# Patient Record
Sex: MALE | Race: BLACK OR AFRICAN AMERICAN | NOT HISPANIC OR LATINO | URBAN - METROPOLITAN AREA
[De-identification: names, ages, dates, MRNs, and addresses within clinical notes are randomized per-mention and may not be internally consistent; named-entity substitution may affect disease eponyms.]

---

## 2018-01-21 ENCOUNTER — INPATIENT (INPATIENT)
Facility: HOSPITAL | Age: 83
LOS: 1 days | Discharge: HOME | End: 2018-01-23
Attending: INTERNAL MEDICINE | Admitting: INTERNAL MEDICINE
Payer: MEDICARE

## 2018-01-21 VITALS
SYSTOLIC BLOOD PRESSURE: 181 MMHG | DIASTOLIC BLOOD PRESSURE: 111 MMHG | OXYGEN SATURATION: 100 % | HEART RATE: 42 BPM | TEMPERATURE: 98 F | RESPIRATION RATE: 16 BRPM

## 2018-01-21 DIAGNOSIS — R06.09 OTHER FORMS OF DYSPNEA: ICD-10-CM

## 2018-01-21 LAB
ALBUMIN SERPL ELPH-MCNC: 4 G/DL — SIGNIFICANT CHANGE UP (ref 3.3–5)
ALP SERPL-CCNC: 61 U/L — SIGNIFICANT CHANGE UP (ref 40–120)
ALT FLD-CCNC: 72 U/L — HIGH (ref 4–41)
APPEARANCE UR: CLEAR — SIGNIFICANT CHANGE UP
APTT BLD: 30.4 SEC — SIGNIFICANT CHANGE UP (ref 27.5–37.4)
AST SERPL-CCNC: 143 U/L — HIGH (ref 4–40)
B PERT DNA SPEC QL NAA+PROBE: SIGNIFICANT CHANGE UP
BASE EXCESS BLDV CALC-SCNC: 4.1 MMOL/L — SIGNIFICANT CHANGE UP
BASOPHILS # BLD AUTO: 0.01 K/UL — SIGNIFICANT CHANGE UP (ref 0–0.2)
BASOPHILS NFR BLD AUTO: 0.2 % — SIGNIFICANT CHANGE UP (ref 0–2)
BILIRUB SERPL-MCNC: 0.5 MG/DL — SIGNIFICANT CHANGE UP (ref 0.2–1.2)
BILIRUB UR-MCNC: NEGATIVE — SIGNIFICANT CHANGE UP
BLOOD GAS VENOUS - CREATININE: 1.7 MG/DL — HIGH (ref 0.5–1.3)
BLOOD UR QL VISUAL: NEGATIVE — SIGNIFICANT CHANGE UP
BUN SERPL-MCNC: 27 MG/DL — HIGH (ref 7–23)
C PNEUM DNA SPEC QL NAA+PROBE: NOT DETECTED — SIGNIFICANT CHANGE UP
CALCIUM SERPL-MCNC: 9 MG/DL — SIGNIFICANT CHANGE UP (ref 8.4–10.5)
CHLORIDE BLDV-SCNC: 100 MMOL/L — SIGNIFICANT CHANGE UP (ref 96–108)
CHLORIDE SERPL-SCNC: 98 MMOL/L — SIGNIFICANT CHANGE UP (ref 98–107)
CK MB BLD-MCNC: 0.3 — SIGNIFICANT CHANGE UP (ref 0–2.5)
CK MB BLD-MCNC: 3.58 NG/ML — SIGNIFICANT CHANGE UP (ref 1–6.6)
CK MB BLD-MCNC: 3.61 NG/ML — SIGNIFICANT CHANGE UP (ref 1–6.6)
CK SERPL-CCNC: 1059 U/L — HIGH (ref 30–200)
CK SERPL-CCNC: 1378 U/L — HIGH (ref 30–200)
CO2 SERPL-SCNC: 27 MMOL/L — SIGNIFICANT CHANGE UP (ref 22–31)
COLOR SPEC: SIGNIFICANT CHANGE UP
CREAT SERPL-MCNC: 1.68 MG/DL — HIGH (ref 0.5–1.3)
D DIMER BLD IA.RAPID-MCNC: < 150 NG/ML — SIGNIFICANT CHANGE UP
EOSINOPHIL # BLD AUTO: 0.05 K/UL — SIGNIFICANT CHANGE UP (ref 0–0.5)
EOSINOPHIL NFR BLD AUTO: 0.9 % — SIGNIFICANT CHANGE UP (ref 0–6)
FLUAV H1 2009 PAND RNA SPEC QL NAA+PROBE: NOT DETECTED — SIGNIFICANT CHANGE UP
FLUAV H1 RNA SPEC QL NAA+PROBE: NOT DETECTED — SIGNIFICANT CHANGE UP
FLUAV H3 RNA SPEC QL NAA+PROBE: NOT DETECTED — SIGNIFICANT CHANGE UP
FLUAV SUBTYP SPEC NAA+PROBE: SIGNIFICANT CHANGE UP
FLUBV RNA SPEC QL NAA+PROBE: NOT DETECTED — SIGNIFICANT CHANGE UP
GAS PNL BLDV: 138 MMOL/L — SIGNIFICANT CHANGE UP (ref 136–146)
GLUCOSE BLDV-MCNC: 105 — HIGH (ref 70–99)
GLUCOSE SERPL-MCNC: 96 MG/DL — SIGNIFICANT CHANGE UP (ref 70–99)
GLUCOSE UR-MCNC: NEGATIVE — SIGNIFICANT CHANGE UP
HADV DNA SPEC QL NAA+PROBE: NOT DETECTED — SIGNIFICANT CHANGE UP
HCO3 BLDV-SCNC: 27 MMOL/L — SIGNIFICANT CHANGE UP (ref 20–27)
HCOV 229E RNA SPEC QL NAA+PROBE: NOT DETECTED — SIGNIFICANT CHANGE UP
HCOV HKU1 RNA SPEC QL NAA+PROBE: NOT DETECTED — SIGNIFICANT CHANGE UP
HCOV NL63 RNA SPEC QL NAA+PROBE: NOT DETECTED — SIGNIFICANT CHANGE UP
HCOV OC43 RNA SPEC QL NAA+PROBE: NOT DETECTED — SIGNIFICANT CHANGE UP
HCT VFR BLD CALC: 38.7 % — LOW (ref 39–50)
HCT VFR BLDV CALC: 41.9 % — SIGNIFICANT CHANGE UP (ref 39–51)
HGB BLD-MCNC: 13.4 G/DL — SIGNIFICANT CHANGE UP (ref 13–17)
HGB BLDV-MCNC: 13.6 G/DL — SIGNIFICANT CHANGE UP (ref 13–17)
HMPV RNA SPEC QL NAA+PROBE: NOT DETECTED — SIGNIFICANT CHANGE UP
HPIV1 RNA SPEC QL NAA+PROBE: NOT DETECTED — SIGNIFICANT CHANGE UP
HPIV2 RNA SPEC QL NAA+PROBE: NOT DETECTED — SIGNIFICANT CHANGE UP
HPIV3 RNA SPEC QL NAA+PROBE: NOT DETECTED — SIGNIFICANT CHANGE UP
HPIV4 RNA SPEC QL NAA+PROBE: NOT DETECTED — SIGNIFICANT CHANGE UP
IMM GRANULOCYTES # BLD AUTO: 0.04 # — SIGNIFICANT CHANGE UP
IMM GRANULOCYTES NFR BLD AUTO: 0.7 % — SIGNIFICANT CHANGE UP (ref 0–1.5)
INR BLD: 1.26 — HIGH (ref 0.88–1.17)
KETONES UR-MCNC: NEGATIVE — SIGNIFICANT CHANGE UP
LACTATE BLDV-MCNC: 1.7 MMOL/L — SIGNIFICANT CHANGE UP (ref 0.5–2)
LEUKOCYTE ESTERASE UR-ACNC: NEGATIVE — SIGNIFICANT CHANGE UP
LYMPHOCYTES # BLD AUTO: 1.74 K/UL — SIGNIFICANT CHANGE UP (ref 1–3.3)
LYMPHOCYTES # BLD AUTO: 31.2 % — SIGNIFICANT CHANGE UP (ref 13–44)
M PNEUMO DNA SPEC QL NAA+PROBE: NOT DETECTED — SIGNIFICANT CHANGE UP
MCHC RBC-ENTMCNC: 29.5 PG — SIGNIFICANT CHANGE UP (ref 27–34)
MCHC RBC-ENTMCNC: 34.6 % — SIGNIFICANT CHANGE UP (ref 32–36)
MCV RBC AUTO: 85.1 FL — SIGNIFICANT CHANGE UP (ref 80–100)
MONOCYTES # BLD AUTO: 0.8 K/UL — SIGNIFICANT CHANGE UP (ref 0–0.9)
MONOCYTES NFR BLD AUTO: 14.3 % — HIGH (ref 2–14)
MUCOUS THREADS # UR AUTO: SIGNIFICANT CHANGE UP
NEUTROPHILS # BLD AUTO: 2.94 K/UL — SIGNIFICANT CHANGE UP (ref 1.8–7.4)
NEUTROPHILS NFR BLD AUTO: 52.7 % — SIGNIFICANT CHANGE UP (ref 43–77)
NITRITE UR-MCNC: NEGATIVE — SIGNIFICANT CHANGE UP
NRBC # FLD: 0 — SIGNIFICANT CHANGE UP
NT-PROBNP SERPL-SCNC: 203.8 PG/ML — SIGNIFICANT CHANGE UP
PCO2 BLDV: 39 MMHG — LOW (ref 41–51)
PH BLDV: 7.46 PH — HIGH (ref 7.32–7.43)
PH UR: 6 — SIGNIFICANT CHANGE UP (ref 4.6–8)
PLATELET # BLD AUTO: 254 K/UL — SIGNIFICANT CHANGE UP (ref 150–400)
PMV BLD: 10 FL — SIGNIFICANT CHANGE UP (ref 7–13)
PO2 BLDV: 32 MMHG — LOW (ref 35–40)
POTASSIUM BLDV-SCNC: 3.3 MMOL/L — LOW (ref 3.4–4.5)
POTASSIUM SERPL-MCNC: 3.4 MMOL/L — LOW (ref 3.5–5.3)
POTASSIUM SERPL-SCNC: 3.4 MMOL/L — LOW (ref 3.5–5.3)
PROT SERPL-MCNC: 7.9 G/DL — SIGNIFICANT CHANGE UP (ref 6–8.3)
PROT UR-MCNC: NEGATIVE MG/DL — SIGNIFICANT CHANGE UP
PROTHROM AB SERPL-ACNC: 14.6 SEC — HIGH (ref 9.8–13.1)
RBC # BLD: 4.55 M/UL — SIGNIFICANT CHANGE UP (ref 4.2–5.8)
RBC # FLD: 12.7 % — SIGNIFICANT CHANGE UP (ref 10.3–14.5)
RBC CASTS # UR COMP ASSIST: SIGNIFICANT CHANGE UP (ref 0–?)
RSV RNA SPEC QL NAA+PROBE: NOT DETECTED — SIGNIFICANT CHANGE UP
RV+EV RNA SPEC QL NAA+PROBE: NOT DETECTED — SIGNIFICANT CHANGE UP
SAO2 % BLDV: 59.6 % — LOW (ref 60–85)
SODIUM SERPL-SCNC: 139 MMOL/L — SIGNIFICANT CHANGE UP (ref 135–145)
SP GR SPEC: 1.01 — SIGNIFICANT CHANGE UP (ref 1–1.04)
TROPONIN T SERPL-MCNC: < 0.06 NG/ML — SIGNIFICANT CHANGE UP (ref 0–0.06)
TROPONIN T SERPL-MCNC: < 0.06 NG/ML — SIGNIFICANT CHANGE UP (ref 0–0.06)
UROBILINOGEN FLD QL: NORMAL MG/DL — SIGNIFICANT CHANGE UP
WBC # BLD: 5.58 K/UL — SIGNIFICANT CHANGE UP (ref 3.8–10.5)
WBC # FLD AUTO: 5.58 K/UL — SIGNIFICANT CHANGE UP (ref 3.8–10.5)
WBC UR QL: SIGNIFICANT CHANGE UP (ref 0–?)

## 2018-01-21 PROCEDURE — 71250 CT THORAX DX C-: CPT | Mod: 26

## 2018-01-21 PROCEDURE — 71045 X-RAY EXAM CHEST 1 VIEW: CPT | Mod: 26

## 2018-01-21 RX ORDER — ALBUTEROL 90 UG/1
2.5 AEROSOL, METERED ORAL
Qty: 0 | Refills: 0 | Status: COMPLETED | OUTPATIENT
Start: 2018-01-21 | End: 2018-01-21

## 2018-01-21 RX ORDER — POTASSIUM CHLORIDE 20 MEQ
20 PACKET (EA) ORAL ONCE
Qty: 0 | Refills: 0 | Status: COMPLETED | OUTPATIENT
Start: 2018-01-21 | End: 2018-01-22

## 2018-01-21 RX ORDER — ASPIRIN/CALCIUM CARB/MAGNESIUM 324 MG
324 TABLET ORAL ONCE
Qty: 0 | Refills: 0 | Status: COMPLETED | OUTPATIENT
Start: 2018-01-21 | End: 2018-01-21

## 2018-01-21 RX ADMIN — ALBUTEROL 2.5 MILLIGRAM(S): 90 AEROSOL, METERED ORAL at 17:02

## 2018-01-21 RX ADMIN — ALBUTEROL 2.5 MILLIGRAM(S): 90 AEROSOL, METERED ORAL at 17:27

## 2018-01-21 RX ADMIN — Medication 324 MILLIGRAM(S): at 18:07

## 2018-01-21 RX ADMIN — ALBUTEROL 2.5 MILLIGRAM(S): 90 AEROSOL, METERED ORAL at 17:14

## 2018-01-21 NOTE — ED ADULT NURSE NOTE - OBJECTIVE STATEMENT
Richard RN- Pt received to spot #5. AAOx4, c/o SOB and dizziness. Pt c/o productive cough. States he is currently taking azithromycin antibiotics prescribed by his MD. Denies fever/chills. Denies chest pain. Respiratory even and unlabored. MD isbell being performed at bedside. #18g IVSL placed to right ac, labs drawn and sent. Family member at bedside. Repot given off to primary RN in area. no acute distress. Awaiting further plan of care.

## 2018-01-21 NOTE — ED ADULT TRIAGE NOTE - CHIEF COMPLAINT QUOTE
Pt c/o SOB, saw MD and was given antibiotics. Condition worsening. Pt c/o SOB and dizziness. hx cardiac stents. Pt HR fluctuating between 40-59.

## 2018-01-21 NOTE — ED PROVIDER NOTE - ATTENDING CONTRIBUTION TO CARE
Attending Note (Esther): patient with sob, exertional dyspnea and dyspnea at rest. recent outpatient tx with z-pack.  lungs decreased bs at bases.  tachypnea.  concern for acs vs chf vs pna.

## 2018-01-21 NOTE — ED ADULT NURSE REASSESSMENT NOTE - NS ED NURSE REASSESS COMMENT FT1
pt on bed aox3 denies SOB, after nebulization,  dizziness CP HA palpitation on cm sinus arrhythmia with PVC will monitor

## 2018-01-21 NOTE — ED PROVIDER NOTE - OBJECTIVE STATEMENT
81 y/o M with h/o CAD s/p 2 stents last a decade ago, HTN, HLD, on eliquis (for unknown reason) presents with complaint of shortness of breath worsening x one week. Patient has also had predominantly nonproductive cough for same amount of time. Gets SOB with minimal exertion whereas previously he was able to perform tasks without difficulty. Was evaluated by PMD at [facility] and started on azithromycin, finished 4/5 days of treatment. +Subjective fever and generalized weakness. Had near syncopal episode one week ago. States that he gets intermittent chest heaviness.

## 2018-01-21 NOTE — ED PROVIDER NOTE - MEDICAL DECISION MAKING DETAILS
81 y/o M with CAD s/p stents, HTN, HLD presents with complaint of cough and SOB x one week which has worsened despite treatment with azithromycin. Bibasilar crackles and wheezing. CHF vs PNA vs Viral. Breathing tx, labs, CXR, reassess. Will likely need admission 81 y/o M with CAD s/p stents, HTN, HLD presents with complaint of cough and SOB x one week which has worsened despite treatment with azithromycin. Bibasilar crackles and wheezing. CHF vs ACS vs PNA vs Viral. Breathing tx, labs, CXR, reassess. Will likely need admission

## 2018-01-22 DIAGNOSIS — I10 ESSENTIAL (PRIMARY) HYPERTENSION: ICD-10-CM

## 2018-01-22 DIAGNOSIS — R06.09 OTHER FORMS OF DYSPNEA: ICD-10-CM

## 2018-01-22 DIAGNOSIS — Z29.9 ENCOUNTER FOR PROPHYLACTIC MEASURES, UNSPECIFIED: ICD-10-CM

## 2018-01-22 DIAGNOSIS — E78.5 HYPERLIPIDEMIA, UNSPECIFIED: ICD-10-CM

## 2018-01-22 DIAGNOSIS — R79.89 OTHER SPECIFIED ABNORMAL FINDINGS OF BLOOD CHEMISTRY: ICD-10-CM

## 2018-01-22 DIAGNOSIS — M62.82 RHABDOMYOLYSIS: ICD-10-CM

## 2018-01-22 LAB
BUN SERPL-MCNC: 25 MG/DL — HIGH (ref 7–23)
CALCIUM SERPL-MCNC: 8.6 MG/DL — SIGNIFICANT CHANGE UP (ref 8.4–10.5)
CHLORIDE SERPL-SCNC: 107 MMOL/L — SIGNIFICANT CHANGE UP (ref 98–107)
CHOLEST SERPL-MCNC: 132 MG/DL — SIGNIFICANT CHANGE UP (ref 120–199)
CK MB BLD-MCNC: 4.51 NG/ML — SIGNIFICANT CHANGE UP (ref 1–6.6)
CK SERPL-CCNC: 870 U/L — HIGH (ref 30–200)
CO2 SERPL-SCNC: 25 MMOL/L — SIGNIFICANT CHANGE UP (ref 22–31)
CREAT SERPL-MCNC: 1.71 MG/DL — HIGH (ref 0.5–1.3)
GLUCOSE SERPL-MCNC: 107 MG/DL — HIGH (ref 70–99)
HAV IGM SER-ACNC: NONREACTIVE — SIGNIFICANT CHANGE UP
HBA1C BLD-MCNC: 6.3 % — HIGH (ref 4–5.6)
HBV CORE IGM SER-ACNC: NONREACTIVE — SIGNIFICANT CHANGE UP
HBV SURFACE AG SER-ACNC: NONREACTIVE — SIGNIFICANT CHANGE UP
HCT VFR BLD CALC: 36.6 % — LOW (ref 39–50)
HCV AB S/CO SERPL IA: 0.12 S/CO — SIGNIFICANT CHANGE UP
HCV AB SERPL-IMP: SIGNIFICANT CHANGE UP
HDLC SERPL-MCNC: 30 MG/DL — LOW (ref 35–55)
HGB BLD-MCNC: 12 G/DL — LOW (ref 13–17)
LIPID PNL WITH DIRECT LDL SERPL: 91 MG/DL — SIGNIFICANT CHANGE UP
MAGNESIUM SERPL-MCNC: 2 MG/DL — SIGNIFICANT CHANGE UP (ref 1.6–2.6)
MCHC RBC-ENTMCNC: 28.4 PG — SIGNIFICANT CHANGE UP (ref 27–34)
MCHC RBC-ENTMCNC: 32.8 % — SIGNIFICANT CHANGE UP (ref 32–36)
MCV RBC AUTO: 86.7 FL — SIGNIFICANT CHANGE UP (ref 80–100)
NRBC # FLD: 0 — SIGNIFICANT CHANGE UP
PLATELET # BLD AUTO: 244 K/UL — SIGNIFICANT CHANGE UP (ref 150–400)
PMV BLD: 10 FL — SIGNIFICANT CHANGE UP (ref 7–13)
POTASSIUM SERPL-MCNC: 3.8 MMOL/L — SIGNIFICANT CHANGE UP (ref 3.5–5.3)
POTASSIUM SERPL-SCNC: 3.8 MMOL/L — SIGNIFICANT CHANGE UP (ref 3.5–5.3)
RBC # BLD: 4.22 M/UL — SIGNIFICANT CHANGE UP (ref 4.2–5.8)
RBC # FLD: 12.9 % — SIGNIFICANT CHANGE UP (ref 10.3–14.5)
SODIUM SERPL-SCNC: 148 MMOL/L — HIGH (ref 135–145)
TRIGL SERPL-MCNC: 109 MG/DL — SIGNIFICANT CHANGE UP (ref 10–149)
TROPONIN T SERPL-MCNC: < 0.06 NG/ML — SIGNIFICANT CHANGE UP (ref 0–0.06)
TSH SERPL-MCNC: 1.62 UIU/ML — SIGNIFICANT CHANGE UP (ref 0.27–4.2)
WBC # BLD: 4.7 K/UL — SIGNIFICANT CHANGE UP (ref 3.8–10.5)
WBC # FLD AUTO: 4.7 K/UL — SIGNIFICANT CHANGE UP (ref 3.8–10.5)

## 2018-01-22 PROCEDURE — 99223 1ST HOSP IP/OBS HIGH 75: CPT

## 2018-01-22 PROCEDURE — 93306 TTE W/DOPPLER COMPLETE: CPT | Mod: 26

## 2018-01-22 PROCEDURE — 76705 ECHO EXAM OF ABDOMEN: CPT | Mod: 26

## 2018-01-22 RX ORDER — CHOLECALCIFEROL (VITAMIN D3) 125 MCG
1000 CAPSULE ORAL DAILY
Qty: 0 | Refills: 0 | Status: DISCONTINUED | OUTPATIENT
Start: 2018-01-22 | End: 2018-01-23

## 2018-01-22 RX ORDER — TIMOLOL 0.5 %
1 DROPS OPHTHALMIC (EYE) DAILY
Qty: 0 | Refills: 0 | Status: DISCONTINUED | OUTPATIENT
Start: 2018-01-22 | End: 2018-01-23

## 2018-01-22 RX ORDER — NIFEDIPINE 30 MG
30 TABLET, EXTENDED RELEASE 24 HR ORAL DAILY
Qty: 0 | Refills: 0 | Status: DISCONTINUED | OUTPATIENT
Start: 2018-01-22 | End: 2018-01-23

## 2018-01-22 RX ORDER — METOPROLOL TARTRATE 50 MG
50 TABLET ORAL
Qty: 0 | Refills: 0 | Status: DISCONTINUED | OUTPATIENT
Start: 2018-01-22 | End: 2018-01-23

## 2018-01-22 RX ORDER — METOPROLOL TARTRATE 50 MG
5 TABLET ORAL ONCE
Qty: 0 | Refills: 0 | Status: COMPLETED | OUTPATIENT
Start: 2018-01-22 | End: 2018-01-22

## 2018-01-22 RX ORDER — POTASSIUM CHLORIDE 20 MEQ
10 PACKET (EA) ORAL ONCE
Qty: 0 | Refills: 0 | Status: COMPLETED | OUTPATIENT
Start: 2018-01-22 | End: 2018-01-22

## 2018-01-22 RX ORDER — SODIUM CHLORIDE 9 MG/ML
1000 INJECTION INTRAMUSCULAR; INTRAVENOUS; SUBCUTANEOUS
Qty: 0 | Refills: 0 | Status: DISCONTINUED | OUTPATIENT
Start: 2018-01-22 | End: 2018-01-23

## 2018-01-22 RX ORDER — APIXABAN 2.5 MG/1
2.5 TABLET, FILM COATED ORAL EVERY 12 HOURS
Qty: 0 | Refills: 0 | Status: DISCONTINUED | OUTPATIENT
Start: 2018-01-22 | End: 2018-01-23

## 2018-01-22 RX ORDER — SODIUM CHLORIDE 9 MG/ML
500 INJECTION INTRAMUSCULAR; INTRAVENOUS; SUBCUTANEOUS ONCE
Qty: 0 | Refills: 0 | Status: COMPLETED | OUTPATIENT
Start: 2018-01-22 | End: 2018-01-22

## 2018-01-22 RX ORDER — CHOLECALCIFEROL (VITAMIN D3) 125 MCG
1 CAPSULE ORAL
Qty: 0 | Refills: 0 | COMMUNITY

## 2018-01-22 RX ADMIN — Medication 20 MILLIEQUIVALENT(S): at 00:03

## 2018-01-22 RX ADMIN — APIXABAN 2.5 MILLIGRAM(S): 2.5 TABLET, FILM COATED ORAL at 05:52

## 2018-01-22 RX ADMIN — Medication 5 MILLIGRAM(S): at 02:16

## 2018-01-22 RX ADMIN — Medication 1000 UNIT(S): at 11:28

## 2018-01-22 RX ADMIN — SODIUM CHLORIDE 1000 MILLILITER(S): 9 INJECTION INTRAMUSCULAR; INTRAVENOUS; SUBCUTANEOUS at 00:45

## 2018-01-22 RX ADMIN — Medication 1 DROP(S): at 18:46

## 2018-01-22 RX ADMIN — Medication 50 MILLIGRAM(S): at 05:52

## 2018-01-22 RX ADMIN — APIXABAN 2.5 MILLIGRAM(S): 2.5 TABLET, FILM COATED ORAL at 18:47

## 2018-01-22 RX ADMIN — Medication 30 MILLIGRAM(S): at 05:52

## 2018-01-22 RX ADMIN — Medication 100 MILLIEQUIVALENT(S): at 08:51

## 2018-01-22 RX ADMIN — SODIUM CHLORIDE 50 MILLILITER(S): 9 INJECTION INTRAMUSCULAR; INTRAVENOUS; SUBCUTANEOUS at 01:20

## 2018-01-22 RX ADMIN — Medication 50 MILLIGRAM(S): at 18:47

## 2018-01-22 NOTE — H&P ADULT - RS GEN PE MLT RESP DETAILS PC
no rales/no wheezes/clear to auscultation bilaterally/good air movement/normal/airway patent/no chest wall tenderness/breath sounds equal/no rhonchi/respirations non-labored/no intercostal retractions

## 2018-01-22 NOTE — H&P ADULT - PROBLEM SELECTOR PLAN 5
Will hold home Pravastatin for now as patient with elevated CK level   Lipid profile in am, low cholesterol diet recommended

## 2018-01-22 NOTE — CONSULT NOTE ADULT - SUBJECTIVE AND OBJECTIVE BOX
cc syncope and SOB    I[place] 81 y/o M with PMH of CAD(s/p 2 stents about 10 years ago), HTN, HLD, On Eliquis for unknown reason as per patient presented with the complaint of SOB x 1 week and generalized weakness. As per the patient he developed gradual shortness of breath last Saturday. Patient stated that Friday night he went to bed fine without any complaints, and woke up the next day with acute SOB. Patient stated that the SOB is worse with exertion with mild relief when he would rest. Patient also endorsed generalized weakness which caused him to fall in the kitchen last Saturday. Patient stated that he was unable to get up and called his neighbor who helped him up. Patient denied any head trauma, LOC, seizure like activity, tongue laceration or any bowel/bladder incontinence. Patient also endorsed dry cough for the past week. Patient had seen his PMD at the [facility] and was started on Azithromycin which he finished 4/5 days. Patient also endorsed subjective fevers and chills. Patient denied any orthopnea or any PND. Patient denied any CP, N/V/D/C, abdominal pain, melena, hematochezia, recent travel, sick contact, pleuritic or positional chest pain.     Allergies NKDA    Home Medications:   * Patient Currently Takes Medications as of 2018 00:36 documented in Structured Notes  · 	NIFEdipine 30 mg oral tablet, extended release: 1 tab(s) orally once a day, Last Dose Taken:    · 	metoprolol tartrate 50 mg oral tablet: 1 tab(s) orally 2 times a day, Last Dose Taken:    · 	valsartan 320 mg oral tablet: 1 tab(s) orally once a day, Last Dose Taken:    · 	pravastatin 40 mg oral tablet: 1 tab(s) orally once a day, Last Dose Taken:    · 	timolol hemihydrate 0.5% ophthalmic solution: 1 drop(s) to each affected eye 2 times a day, Last Dose Taken:    · 	Eliquis 5 mg oral tablet: 1 tab(s) orally 2 times a day, Last Dose Taken:    · 	Vitamin D3 1000 intl units oral tablet: 1 tab(s) orally once a day  · 	famotidine 40 mg oral tablet: 1 tab(s) orally once a day, As Needed - for heartburn, Last Dose Taken:      Patient History:   Past Medical History:  CAD (coronary artery disease)  s/p 2 stents  Hyperlipidemia    Hypertension.    Past Surgical History:  No significant past surgical history.    Family History:  No pertinent family history in first degree relatives.    Social History:  Social History (marital status, living situation, occupation, tobacco use, alcohol and drug use, and sexual history): , lives alone, retired  Quit smoking 25 years ago and smoked 1 pack a day, denied any drinking or any illicit drugs use	    PHYSICAL EXAM  General: WN/WD NAD  PERRLA  Neurology: A&Ox3, nonfocal, VOGT x 4  Respiratory: CTA B/L  CV: RRR, S1S2, no murmurs, rubs or gallops  Abdominal: Soft, NT, ND +BS, Last BM  Extremities: No edema, + peripheral pulses  Skin Normal     Lab Results:  CBC  CBC Full  -  ( 2018 06:00 )  WBC Count : 4.70 K/uL  Hemoglobin : 12.0 g/dL  Hematocrit : 36.6 %  Platelet Count - Automated : 244 K/uL  Mean Cell Volume : 86.7 fL  Mean Cell Hemoglobin : 28.4 pg  Mean Cell Hemoglobin Concentration : 32.8 %  Auto Neutrophil # : x  Auto Lymphocyte # : x  Auto Monocyte # : x  Auto Eosinophil # : x  Auto Basophil # : x  Auto Neutrophil % : x  Auto Lymphocyte % : x  Auto Monocyte % : x  Auto Eosinophil % : x  Auto Basophil % : x    .		Differential:	[] Automated		[] Manual  Chemistry                        12.0   4.70  )-----------( 244      ( 2018 06:00 )             36.6         148<H>  |  107  |  25<H>  ----------------------------<  107<H>  3.8   |  25  |  1.71<H>    Ca    8.6      2018 06:00  Mg     2.0         TPro  7.9  /  Alb  4.0  /  TBili  0.5  /  DBili  x   /  AST  143<H>  /  ALT  72<H>  /  AlkPhos  61      LIVER FUNCTIONS - ( 2018 16:45 )  Alb: 4.0 g/dL / Pro: 7.9 g/dL / ALK PHOS: 61 u/L / ALT: 72 u/L / AST: 143 u/L / GGT: x           PT/INR - ( 2018 16:45 )   PT: 14.6 SEC;   INR: 1.26          PTT - ( 2018 16:45 )  PTT:30.4 SEC  Urinalysis Basic - ( 2018 18:45 )    Color: PLYEL / Appearance: CLEAR / S.012 / pH: 6.0  Gluc: NEGATIVE / Ketone: NEGATIVE  / Bili: NEGATIVE / Urobili: NORMAL mg/dL   Blood: NEGATIVE / Protein: NEGATIVE mg/dL / Nitrite: NEGATIVE   Leuk Esterase: NEGATIVE / RBC: 0-2 / WBC 2-5   Sq Epi: x / Non Sq Epi: x / Bacteria: x            MICROBIOLOGY/CULTURES:      RADIOLOGY RESULTS: REVIEWED

## 2018-01-22 NOTE — PHYSICAL THERAPY INITIAL EVALUATION ADULT - ADDITIONAL COMMENTS
Pt. was left seated in chair, NAD, call bell within reach and son present. RN Tomy aware of pt. status and participation in PT.

## 2018-01-22 NOTE — H&P ADULT - GASTROINTESTINAL DETAILS
soft/normal/no rebound tenderness/no rigidity/no distention/bowel sounds normal/nontender/no guarding

## 2018-01-22 NOTE — PROVIDER CONTACT NOTE (OTHER) - BACKGROUND
Pt admitted for dyspnea on exertion. Hx of HTN, HLD, CAD, 2 stents. Went tachy into 130's earlier in the evening and came back to baseline.

## 2018-01-22 NOTE — H&P ADULT - NEGATIVE OPHTHALMOLOGIC SYMPTOMS
no blurred vision L/no discharge L/no lacrimation L/no lacrimation R/no discharge R/no blurred vision R/no diplopia/no photophobia

## 2018-01-22 NOTE — H&P ADULT - NEGATIVE NEUROLOGICAL SYMPTOMS
no transient paralysis/no paresthesias/no generalized seizures/no loss of consciousness/no hemiparesis/no headache/no confusion/no vertigo/no loss of sensation/no focal seizures/no syncope/no tremors

## 2018-01-22 NOTE — PHYSICAL THERAPY INITIAL EVALUATION ADULT - CRITERIA FOR SKILLED THERAPEUTIC INTERVENTIONS
therapy frequency/anticipated discharge recommendation/rehab potential/predicted duration of therapy intervention/functional limitations in following categories/impairments found

## 2018-01-22 NOTE — H&P ADULT - PROBLEM SELECTOR PLAN 6
Will need to verify with primary cardiologist in am why patient is on Eliquis, Dr. Fredo [phone] at [facility]. Will continue with Eliquis for now until verified in am

## 2018-01-22 NOTE — H&P ADULT - HISTORY OF PRESENT ILLNESS
81 y/o M with PMH of CAD(s/p 2 stents about 10 years ago), HTN, HLD, On Eliquis for unknown reason as per patient presented with the complaint of SOB x 1 week and generalized weakness. As per the patient he developed gradual shortness of breath last Saturday. Patient stated that Friday night he went to bed fine without any complaints, and woke up the next day with acute SOB. Patient stated that the SOB is worse with exertion with mild relief when he would rest. Patient also endorsed generalized weakness which caused him to fall in the kitchen last Saturday. Patient stated that he was unable to get up and called his neighbor who helped him up. Patient denied any head trauma, LOC, seizure like activity, tongue laceration or any bowel/bladder incontinence. Patient also endorsed dry cough for the past week. Patient had seen his PMD at the [facility] and was started on Azithromycin which he finished 4/5 days. Patient also endorsed subjective fevers and chills. Patient denied any orthopnea or any PND. Patient denied any CP, N/V/D/C, abdominal pain, melena, hematochezia, recent travel, sick contact, pleuritic or positional chest pain.     On ED admission EKG revealed Sinus rhythm with occasionally PVC, possible LAE, with LVH at a rate of 65 with TWI in lead III and QTc of 465, CE x 1: Trop: Negative, CK: 1378>>1059, H&H: 13.4/38.7, D-dimer: Negative, K: 3.4, BUN/Cr: 27/1.68, AST: 143, ALT: 72, BNP: 203.8, UA: Negative , RVP: Negative. CXR: No emergent or urgent findings. When examined patient is resting in the stretcher and denied any current complaints.

## 2018-01-22 NOTE — CONSULT NOTE ADULT - ASSESSMENT
81 y/o M with PMH of CAD(s/p 2 stents about 10 years ago), HTN, HLD, On Eliquis for unknown reason as per patient presented with the complaint of SOB x 1 week and generalized weakness. R/o ACS     Dyspnea on exertion.  Plan: RO ACS  CHECK ce  TTE to ro chf  Ischemia billy as per cards  cw current meds     Non-traumatic rhabdomyolysis.  Plan: likley sec to fall vs sec to statins  Fall precautions ordered   monitor cpk     Elevated LFTs.  Plan: check acute hepatitis panel  check Abdominal US   trend LFTS     Essential hypertension.  Plan: Continue with antihypertensive medications   DASH diet recommended.      Hyperlipidemia.  Plan: Will hold home Pravastatin for now as patient with elevated CK level   will monitor

## 2018-01-22 NOTE — PHYSICAL THERAPY INITIAL EVALUATION ADULT - PERTINENT HX OF CURRENT PROBLEM, REHAB EVAL
Pt. admitted for SOB, generalized weakness, and unwitnessed fall. R/O ACS.  PMH of CAD(s/p 2 stents about 10 years ago), HTN, HLD.

## 2018-01-22 NOTE — PHYSICAL THERAPY INITIAL EVALUATION ADULT - GENERAL OBSERVATIONS, REHAB EVAL
Patient received supine in bed, NAD, +tele, son present. Patient agreed to EVALUATION from Physical Therapist.

## 2018-01-22 NOTE — H&P ADULT - NSHPSOCIALHISTORY_GEN_ALL_CORE
, lives alone, retired   Quit smoking 25 years ago and smoked 1 pack a day, denied any drinking or any illicit drugs use

## 2018-01-22 NOTE — H&P ADULT - PROBLEM SELECTOR PLAN 1
Patient had RVP, D-dimer and pro-BNP which were all negative. Will still need to r/o ACS as patient with Hx of 2 stents   Will monitor on telemetry, serial EKG and Jarad prn for any episodes of chest pain or SOB/palpitations  HgbA1C, TSH, lipid profile, CBC, CMP in am   TTE ordered to evaluate LVEF  Consider pulmonary consult in am if warranted if ACS ruled out

## 2018-01-22 NOTE — H&P ADULT - ASSESSMENT
81 y/o M with PMH of CAD(s/p 2 stents about 10 years ago), HTN, HLD, On Eliquis for unknown reason as per patient presented with the complaint of SOB x 1 week and generalized weakness. R/o ACS

## 2018-01-22 NOTE — H&P ADULT - NEGATIVE MUSCULOSKELETAL SYMPTOMS
no arthralgia/no muscle cramps/no muscle weakness/no stiffness/no joint swelling/no arthritis/no myalgia

## 2018-01-22 NOTE — PHYSICAL THERAPY INITIAL EVALUATION ADULT - DISCHARGE DISPOSITION, PT EVAL
Outpatient Physical Therapy to address slight impairments in general strength and balance. PT while inpatient for 1-2 more sessions.

## 2018-01-22 NOTE — H&P ADULT - NSHPLABSRESULTS_GEN_ALL_CORE
13.4   5.58  )-----------( 254      ( 21 Jan 2018 16:45 )             38.7     01-21    139  |  98  |  27<H>  ----------------------------<  96  3.4<L>   |  27  |  1.68<H>    Ca    9.0      21 Jan 2018 16:45    TPro  7.9  /  Alb  4.0  /  TBili  0.5  /  DBili  x   /  AST  143<H>  /  ALT  72<H>  /  AlkPhos  61  01-21    CARDIAC MARKERS ( 21 Jan 2018 22:37 )  x     / < 0.06 ng/mL / 1059 u/L / 3.58 ng/mL / x      CARDIAC MARKERS ( 21 Jan 2018 16:45 )  x     / < 0.06 ng/mL / 1378 u/L / 3.61 ng/mL / x        CXR: No emergent or urgent findings.    EKG: Sinus rhythm with occasionally PVC, possible LAE, with LVH at a rate of 65 with TWI in lead III and QTc of 465

## 2018-01-22 NOTE — H&P ADULT - NEGATIVE GASTROINTESTINAL SYMPTOMS
no nausea/no constipation/no change in bowel habits/no vomiting/no abdominal pain/no melena/no hematochezia/no diarrhea

## 2018-01-23 VITALS
TEMPERATURE: 98 F | DIASTOLIC BLOOD PRESSURE: 60 MMHG | RESPIRATION RATE: 18 BRPM | HEART RATE: 55 BPM | OXYGEN SATURATION: 100 % | SYSTOLIC BLOOD PRESSURE: 117 MMHG

## 2018-01-23 LAB
BUN SERPL-MCNC: 27 MG/DL — HIGH (ref 7–23)
CALCIUM SERPL-MCNC: 8.6 MG/DL — SIGNIFICANT CHANGE UP (ref 8.4–10.5)
CHLORIDE SERPL-SCNC: 104 MMOL/L — SIGNIFICANT CHANGE UP (ref 98–107)
CO2 SERPL-SCNC: 25 MMOL/L — SIGNIFICANT CHANGE UP (ref 22–31)
CREAT SERPL-MCNC: 1.42 MG/DL — HIGH (ref 0.5–1.3)
GLUCOSE SERPL-MCNC: 105 MG/DL — HIGH (ref 70–99)
HCT VFR BLD CALC: 36.2 % — LOW (ref 39–50)
HGB BLD-MCNC: 12.1 G/DL — LOW (ref 13–17)
MAGNESIUM SERPL-MCNC: 1.8 MG/DL — SIGNIFICANT CHANGE UP (ref 1.6–2.6)
MCHC RBC-ENTMCNC: 28.6 PG — SIGNIFICANT CHANGE UP (ref 27–34)
MCHC RBC-ENTMCNC: 33.4 % — SIGNIFICANT CHANGE UP (ref 32–36)
MCV RBC AUTO: 85.6 FL — SIGNIFICANT CHANGE UP (ref 80–100)
NRBC # FLD: 0 — SIGNIFICANT CHANGE UP
PHOSPHATE SERPL-MCNC: 2.2 MG/DL — LOW (ref 2.5–4.5)
PLATELET # BLD AUTO: 245 K/UL — SIGNIFICANT CHANGE UP (ref 150–400)
PMV BLD: 9.9 FL — SIGNIFICANT CHANGE UP (ref 7–13)
POTASSIUM SERPL-MCNC: 4 MMOL/L — SIGNIFICANT CHANGE UP (ref 3.5–5.3)
POTASSIUM SERPL-SCNC: 4 MMOL/L — SIGNIFICANT CHANGE UP (ref 3.5–5.3)
RBC # BLD: 4.23 M/UL — SIGNIFICANT CHANGE UP (ref 4.2–5.8)
RBC # FLD: 13.1 % — SIGNIFICANT CHANGE UP (ref 10.3–14.5)
SODIUM SERPL-SCNC: 140 MMOL/L — SIGNIFICANT CHANGE UP (ref 135–145)
WBC # BLD: 5.37 K/UL — SIGNIFICANT CHANGE UP (ref 3.8–10.5)
WBC # FLD AUTO: 5.37 K/UL — SIGNIFICANT CHANGE UP (ref 3.8–10.5)

## 2018-01-23 PROCEDURE — 99239 HOSP IP/OBS DSCHRG MGMT >30: CPT

## 2018-01-23 RX ADMIN — Medication 1 DROP(S): at 13:20

## 2018-01-23 RX ADMIN — APIXABAN 2.5 MILLIGRAM(S): 2.5 TABLET, FILM COATED ORAL at 05:21

## 2018-01-23 RX ADMIN — Medication 30 MILLIGRAM(S): at 05:21

## 2018-01-23 RX ADMIN — Medication 1000 UNIT(S): at 13:20

## 2018-01-23 NOTE — DISCHARGE NOTE ADULT - PATIENT PORTAL LINK FT
“You can access the [employer] Patient Portal, offered by [facility], by registering with the following website: [email or web address]”

## 2018-01-23 NOTE — DISCHARGE NOTE ADULT - PROVIDER TOKENS
FREE:[LAST:[Dr. Frye],FIRST:[Fredo],PHONE:[(   )    -],FAX:[(   )    -],ADDRESS:[call for appointment]],FREE:[LAST:[Dr Cobb],PHONE:[(   )    -],FAX:[(   )    -],ADDRESS:[call for appointment]]

## 2018-01-23 NOTE — PROGRESS NOTE ADULT - ASSESSMENT
81 y/o M with PMH of CAD(s/p 2 stents about 10 years ago), HTN, HLD, On Eliquis for unknown reason as per patient presented with the complaint of SOB x 1 week and generalized weakness.     No new events noted on telemetry  Symptomatically improved  TTE unremarkable    D/C on home medications including apixaban for pAF  Patient has his own cardiologist who he should follow 1-2 weeks after discharge.

## 2018-01-23 NOTE — PROGRESS NOTE ADULT - SUBJECTIVE AND OBJECTIVE BOX
Cardiology Attending Progress Note    No new complaints  No new events on telemetry: SB, SR. PACs, PVCs.    Vital Signs Last 24 Hrs  T(C): 36.8 (23 Jan 2018 05:17), Max: 36.8 (23 Jan 2018 05:17)  T(F): 98.2 (23 Jan 2018 05:17), Max: 98.2 (23 Jan 2018 05:17)  HR: 55 (23 Jan 2018 05:17) (55 - 65)  BP: 117/60 (23 Jan 2018 05:17) (117/60 - 141/65)  BP(mean): --  RR: 18 (23 Jan 2018 05:17) (18 - 18)  SpO2: 100% (23 Jan 2018 05:17) (97% - 100%)    NAD  No JVD  Reg S1S2 2/6 SM  CTAB  Soft obese abdomen  No edema    MEDICATIONS  (STANDING):  apixaban 2.5 milliGRAM(s) Oral every 12 hours  cholecalciferol 1000 Unit(s) Oral daily  metoprolol     tartrate 50 milliGRAM(s) Oral two times a day  NIFEdipine XL 30 milliGRAM(s) Oral daily  sodium chloride 0.9%. 1000 milliLiter(s) (50 mL/Hr) IV Continuous <Continuous>  timolol 0.5% Solution 1 Drop(s) Both EYES daily                          12.1   5.37  )-----------( 245      ( 23 Jan 2018 06:30 )             36.2   01-23    140  |  104  |  27<H>  ----------------------------<  105<H>  4.0   |  25  |  1.42<H>    Ca    8.6      23 Jan 2018 06:30  Phos  2.2     01-23  Mg     1.8     01-23    TPro  7.9  /  Alb  4.0  /  TBili  0.5  /  DBili  x   /  AST  143<H>  /  ALT  72<H>  /  AlkPhos  61  01-21    PT/INR - ( 21 Jan 2018 16:45 )   PT: 14.6 SEC;   INR: 1.26     PTT - ( 21 Jan 2018 16:45 )  PTT:30.4 SEC      < from: CT Chest No Cont (01.21.18 @ 23:05) >    EXAM:  CT CHEST        PROCEDURE DATE:  Jan 21 2018         INTERPRETATION:  CLINICAL INFORMATION: Shortness of breath    COMPARISON: Chest radiograph 1/21/2018.    PROCEDURE:   CT of the Chest was performed without intravenous contrast.  Sagittal and coronal reformats were performed.    FINDINGS:    CHEST:     LUNGS AND LARGE AIRWAYS: Patent central airways.  No pulmonary nodules.   No focal consolidation. Mild bilateral upper lobe centrilobular emphysema.  PLEURA: No pleural effusion.   VESSELS: Normal left-sided aortic arch and descending aorta. No aneurysm.   Atherosclerotic changes.  HEART: Heart size is normal. No pericardial effusion. Atherosclerotic   calcifications involving the main coronary arteries.  MEDIASTINUM AND KELLEY: No lymphadenopathy.  CHEST WALL AND LOWER NECK: Within normal limits.  VISUALIZED UPPER ABDOMEN: Within normal limits.  BONES: Degenerative changes throughout the visualized vertebral spine   including endplate sclerosis and osteophyte formation.    IMPRESSION: No pneumonia.  Mild emphysema.    JAYSON HUSSEIN M.D., RADIOLOGY RESIDENT  This document has been electronically signed.  HENRY NATHAN M.D., ATTENDING RADIOLOGIST  This document has been electronically signed. Jan 22 2018  2:42PM            < from: US Abdomen Limited (01.22.18 @ 13:39) >  EXAM:  US ABDOMEN LIMITED        PROCEDURE DATE:  Jan 22 2018         INTERPRETATION:  CLINICAL INFORMATION: 82-year-old male with increased   LFTs.    COMPARISON: None available.    TECHNIQUE: Sonography of the right upper quadrant.     FINDINGS:    Liver: Within normal limits.  Bile ducts: Normal caliber.   Gallbladder: Within normal limits.      Pancreas: Visualized portions are within normal limits.  Right kidney: 8.7 cm. No hydronephrosis.  Ascites: None.  IVC: Visualized portions are within normal limits.    IMPRESSION:     Normal right upper quadrant abdominal ultrasound.      CAREY FORREST M.D., ATTENDING RADIOLOGIST  This document has been electronically signed. Jan 22 2018  1:16PM        < from: Transthoracic Echocardiogram (01.22.18 @ 14:58) >  Patient name: MANJINDER GARCES  YOB: 1935   Age: 82 (M)   MR#: [id number]  Study Date: 1/22/2018  Location: [address] [place], [place] [address]grapher: Genia Sheetsstein  Study quality: Technically Fair  Referring Physician: Manjinder Mckenzie MD  Blood Pressure: 140/67 mmHg  Height: 170 cm  Weight: 83 kg  BSA: 2 m2  ------------------------------------------------------------------------  PROCEDURE: Transthoracic echocardiogram with 2-D, M-Mode  and complete spectral and color flow Doppler.  INDICATION: Dyspnea, unspecified (R06.00)  ------------------------------------------------------------------------  DIMENSIONS:  Dimensions:     Normal Values:  LA:     3.1 cm    2.0 - 4.0 cm  Ao:     3.1 cm    2.0 - 3.8 cm  SEPTUM: 0.8 cm    0.6 - 1.2 cm  PWT:    0.8 cm    0.6- 1.1 cm  LVIDd:  4.3 cm    3.0 - 5.6 cm  LVIDs:  2.8 cm    1.8 - 4.0 cm  Derived Variables:  LVMI: 54 g/m2  RWT: 0.37  Fractional short: 35 %  Ejection Fraction (Teicholtz): 64 %  ------------------------------------------------------------------------  OBSERVATIONS:  Mitral Valve: Mitral annular calcification, otherwise  normal mitral valve. Mild mitral regurgitation.  Aortic Root: Normal aortic root.  Aortic Valve: Calcified trileaflet aortic valve with normal  opening.  Left Atrium: Normal left atrium.  Left Ventricle: Endocardium not well visualized; grossly  normal left ventricular systolic function. Normal left  ventricular internal dimensions and wall thicknesses.  Right Heart: Normal right atrium. The right ventricle is  not well visualized; grossly normal right ventricular  systolic function. Normal tricuspid valve. Minimal  tricuspid regurgitation. Normal pulmonic valve.  Pericardium/PleuraNormal pericardium with no pericardial  effusion.  Hemodynamic: Estimated right ventricular systolic pressure  equals 38 mm Hg, assuming right atrial pressure equals 10  mm Hg, consistent with borderline pulmonary hypertension.  ------------------------------------------------------------------------  CONCLUSIONS:  1. Mitral annular calcification, otherwise normal mitral  valve. Mild mitral regurgitation.  2. Normal left ventricular internal dimensions and wall  thicknesses.  3. Endocardium not well visualized; grossly normal left  ventricular systolic function.  4. The right ventricle is not well visualized; grossly  normal right ventricular systolic function.  5. Estimated right ventricular systolic pressure equals 38  mm Hg, assuming right atrial pressure equals 10 mm Hg,  consistent with borderline pulmonary hypertension.  ------------------------------------------------------------------------  Confirmed on  1/22/2018 - 16:05:40 by Wilbur Mcclellan M.D.  ------------------------------------------------------------------------    < end of copied text >

## 2018-01-23 NOTE — DISCHARGE NOTE ADULT - CARE PLAN
Principal Discharge DX:	Dyspnea on exertion  Goal:	prevent further episodes  Assessment and plan of treatment:	follow up with your PMD in one week - call for appointment  Secondary Diagnosis:	CAD (coronary artery disease)  Secondary Diagnosis:	Atrial fibrillation

## 2018-01-23 NOTE — DISCHARGE NOTE ADULT - CARE PROVIDER_API CALL
Fredo Duckworth  call for appointment  Phone: (   )    -  Fax: (   )    -    Dr Cobb,   call for appointment  Phone: (   )    -  Fax: (   )    -

## 2018-01-23 NOTE — DISCHARGE NOTE ADULT - MEDICATION SUMMARY - MEDICATIONS TO TAKE
I will START or STAY ON the medications listed below when I get home from the hospital:    valsartan 320 mg oral tablet  -- 1 tab(s) by mouth once a day  -- Indication: For HTN    Eliquis 5 mg oral tablet  -- 1 tab(s) by mouth 2 times a day  -- Indication: For Atrial fibrillation    pravastatin 40 mg oral tablet  -- 1 tab(s) by mouth once a day  -- Indication: For Hyperlipidemia    metoprolol tartrate 50 mg oral tablet  -- 1 tab(s) by mouth 2 times a day  -- Indication: For Hypertension    NIFEdipine 30 mg oral tablet, extended release  -- 1 tab(s) by mouth once a day  -- Indication: For Hypertension    famotidine 40 mg oral tablet  -- 1 tab(s) by mouth once a day, As Needed - for heartburn  -- Indication: For GERD    timolol hemihydrate 0.5% ophthalmic solution  -- 1 drop(s) to each affected eye 2 times a day  -- Indication: For Eye drops    Vitamin D3 1000 intl units oral tablet  -- 1 tab(s) by mouth once a day  -- Indication: For supplement

## 2018-01-23 NOTE — DISCHARGE NOTE ADULT - HOSPITAL COURSE
81 y/o M with PMH of CAD(s/p 2 stents about 10 years ago), HTN, HLD, On Eliquis for unknown reason as per patient presented with the complaint of SOB x 1 week and generalized weakness. R/o ACS     + Shortness of breath - CT chest mild emphysema  +Elevated CK-s/p 500cc bolus and on maintenance x 10 hours    + NSVT - 1/22 - lytes repleted   + elevated LFT's - RUQ sono normal 81 y/o M with PMH of CAD(s/p 2 stents about 10 years ago), HTN, HLD, On Eliquis for unknown reason as per patient presented with the complaint of SOB x 1 week and generalized weakness. R/o ACS     + Shortness of breath - CT chest mild emphysema  +Elevated CK-s/p 500cc bolus and on maintenance x 10 hours    + NSVT - 1/22 - lytes repleted   + elevated LFT's - RUQ sono normal     EKG: Sinus rhythm with occasional PVC, possible LAE, with LVH at a rate of 65 with TWI in lead III and [place] of [address] y/o M with PMH of CAD(s/p 2 stents about 10 years ago), HTN, HLD, On Eliquis for unknown reason as per patient presented with the complaint of SOB x 1 week and generalized weakness. R/o ACS   CE x 1: Trop: Negative, CK: 1378>>1059  H&H: 13.  4/38.  7  D-dimer: Negative   K: 3.  4  BUN/Cr: 27/1.  68  AST: 143  ALT: 72  BNP: 203.  8  UA: Negative   RVP: Negative   CXR: No emergent or urgent findings.    1/22-  RUQ sono - normal+ Shortness of breath - CT chest mild emphysema  +Elevated CK-s/p 500cc bolus and on maintenance x 10 hours    + NSVT - 1/22 - lytes repleted   + elevated LFT's - RUQ sono normal   1/22- CT chest - mild emphysema, no PNA   1/22- Transthoracic Echocardiogram - Mitral annular calcification, otherwise normal mitral  valve.   Mild mitral regurgitation.   Normal left ventricular internal dimensions and wall  thicknesses.   Endocardium not well visualized; grossly normal left ventricular systolic function.   The right ventricle is not well visualized; grossly normal right ventricular systolic function.   Estimated right ventricular systolic pressure equals 38 mm Hg, assuming right atrial pressure equals 10 mm Hg, consistent with borderline pulmonary hypertension.        Discussed with MD - pt stable for discharge home, pt with no complaints, discharge medications reviewed with MD.

## 2019-01-20 ENCOUNTER — INPATIENT (INPATIENT)
Facility: HOSPITAL | Age: 84
LOS: 2 days | Discharge: HOME | DRG: 299 | End: 2019-01-23
Attending: INTERNAL MEDICINE | Admitting: INTERNAL MEDICINE
Payer: COMMERCIAL

## 2019-01-20 VITALS
WEIGHT: 184.97 LBS | RESPIRATION RATE: 19 BRPM | SYSTOLIC BLOOD PRESSURE: 233 MMHG | OXYGEN SATURATION: 95 % | HEART RATE: 47 BPM | HEIGHT: 67 IN | TEMPERATURE: 98 F | DIASTOLIC BLOOD PRESSURE: 90 MMHG

## 2019-01-20 DIAGNOSIS — I26.99 OTHER PULMONARY EMBOLISM WITHOUT ACUTE COR PULMONALE: ICD-10-CM

## 2019-01-20 LAB
ALBUMIN SERPL ELPH-MCNC: 4.3 G/DL — SIGNIFICANT CHANGE UP (ref 3.3–5)
ALP SERPL-CCNC: 83 U/L — SIGNIFICANT CHANGE UP (ref 40–120)
ALT FLD-CCNC: 14 U/L — SIGNIFICANT CHANGE UP (ref 10–45)
ANION GAP SERPL CALC-SCNC: 11 MMOL/L — SIGNIFICANT CHANGE UP (ref 5–17)
APTT BLD: 27.4 SEC — LOW (ref 27.5–36.3)
AST SERPL-CCNC: 17 U/L — SIGNIFICANT CHANGE UP (ref 10–40)
BASOPHILS # BLD AUTO: 0 K/UL — SIGNIFICANT CHANGE UP (ref 0–0.2)
BASOPHILS NFR BLD AUTO: 0.1 % — SIGNIFICANT CHANGE UP (ref 0–2)
BILIRUB SERPL-MCNC: 0.4 MG/DL — SIGNIFICANT CHANGE UP (ref 0.2–1.2)
BLD GP AB SCN SERPL QL: NEGATIVE — SIGNIFICANT CHANGE UP
BUN SERPL-MCNC: 15 MG/DL — SIGNIFICANT CHANGE UP (ref 7–23)
CALCIUM SERPL-MCNC: 9 MG/DL — SIGNIFICANT CHANGE UP (ref 8.4–10.5)
CHLORIDE SERPL-SCNC: 104 MMOL/L — SIGNIFICANT CHANGE UP (ref 96–108)
CO2 SERPL-SCNC: 25 MMOL/L — SIGNIFICANT CHANGE UP (ref 22–31)
CREAT SERPL-MCNC: 1.36 MG/DL — HIGH (ref 0.5–1.3)
EOSINOPHIL # BLD AUTO: 0.1 K/UL — SIGNIFICANT CHANGE UP (ref 0–0.5)
EOSINOPHIL NFR BLD AUTO: 1.6 % — SIGNIFICANT CHANGE UP (ref 0–6)
GAS PNL BLDV: SIGNIFICANT CHANGE UP
GLUCOSE SERPL-MCNC: 110 MG/DL — HIGH (ref 70–99)
HCT VFR BLD CALC: 42.6 % — SIGNIFICANT CHANGE UP (ref 39–50)
HGB BLD-MCNC: 14.6 G/DL — SIGNIFICANT CHANGE UP (ref 13–17)
INR BLD: 1.04 RATIO — SIGNIFICANT CHANGE UP (ref 0.88–1.16)
LYMPHOCYTES # BLD AUTO: 1.4 K/UL — SIGNIFICANT CHANGE UP (ref 1–3.3)
LYMPHOCYTES # BLD AUTO: 25.7 % — SIGNIFICANT CHANGE UP (ref 13–44)
MAGNESIUM SERPL-MCNC: 2.4 MG/DL — SIGNIFICANT CHANGE UP (ref 1.6–2.6)
MCHC RBC-ENTMCNC: 29.9 PG — SIGNIFICANT CHANGE UP (ref 27–34)
MCHC RBC-ENTMCNC: 34.3 GM/DL — SIGNIFICANT CHANGE UP (ref 32–36)
MCV RBC AUTO: 87.2 FL — SIGNIFICANT CHANGE UP (ref 80–100)
MONOCYTES # BLD AUTO: 0.4 K/UL — SIGNIFICANT CHANGE UP (ref 0–0.9)
MONOCYTES NFR BLD AUTO: 8.3 % — SIGNIFICANT CHANGE UP (ref 2–14)
NEUTROPHILS # BLD AUTO: 3.5 K/UL — SIGNIFICANT CHANGE UP (ref 1.8–7.4)
NEUTROPHILS NFR BLD AUTO: 64.2 % — SIGNIFICANT CHANGE UP (ref 43–77)
PHOSPHATE SERPL-MCNC: 2.5 MG/DL — SIGNIFICANT CHANGE UP (ref 2.5–4.5)
PLATELET # BLD AUTO: 183 K/UL — SIGNIFICANT CHANGE UP (ref 150–400)
POTASSIUM SERPL-MCNC: 4.2 MMOL/L — SIGNIFICANT CHANGE UP (ref 3.5–5.3)
POTASSIUM SERPL-SCNC: 4.2 MMOL/L — SIGNIFICANT CHANGE UP (ref 3.5–5.3)
PROT SERPL-MCNC: 7.9 G/DL — SIGNIFICANT CHANGE UP (ref 6–8.3)
PROTHROM AB SERPL-ACNC: 12 SEC — SIGNIFICANT CHANGE UP (ref 10–12.9)
RBC # BLD: 4.88 M/UL — SIGNIFICANT CHANGE UP (ref 4.2–5.8)
RBC # FLD: 13 % — SIGNIFICANT CHANGE UP (ref 10.3–14.5)
RH IG SCN BLD-IMP: NEGATIVE — SIGNIFICANT CHANGE UP
SODIUM SERPL-SCNC: 140 MMOL/L — SIGNIFICANT CHANGE UP (ref 135–145)
TROPONIN T, HIGH SENSITIVITY RESULT: 10 NG/L — SIGNIFICANT CHANGE UP (ref 0–51)
TROPONIN T, HIGH SENSITIVITY RESULT: 9 NG/L — SIGNIFICANT CHANGE UP (ref 0–51)
WBC # BLD: 5.4 K/UL — SIGNIFICANT CHANGE UP (ref 3.8–10.5)
WBC # FLD AUTO: 5.4 K/UL — SIGNIFICANT CHANGE UP (ref 3.8–10.5)

## 2019-01-20 PROCEDURE — 99285 EMERGENCY DEPT VISIT HI MDM: CPT

## 2019-01-20 RX ORDER — METOPROLOL TARTRATE 50 MG
50 TABLET ORAL DAILY
Qty: 0 | Refills: 0 | Status: DISCONTINUED | OUTPATIENT
Start: 2019-01-20 | End: 2019-01-23

## 2019-01-20 RX ORDER — HYDRALAZINE HCL 50 MG
50 TABLET ORAL EVERY 8 HOURS
Qty: 0 | Refills: 0 | Status: DISCONTINUED | OUTPATIENT
Start: 2019-01-20 | End: 2019-01-23

## 2019-01-20 RX ORDER — APIXABAN 2.5 MG/1
10 TABLET, FILM COATED ORAL EVERY 12 HOURS
Qty: 0 | Refills: 0 | Status: DISCONTINUED | OUTPATIENT
Start: 2019-01-20 | End: 2019-01-23

## 2019-01-20 RX ORDER — HYDRALAZINE HCL 50 MG
10 TABLET ORAL ONCE
Qty: 0 | Refills: 0 | Status: COMPLETED | OUTPATIENT
Start: 2019-01-20 | End: 2019-01-20

## 2019-01-20 RX ORDER — LOSARTAN POTASSIUM 100 MG/1
100 TABLET, FILM COATED ORAL DAILY
Qty: 0 | Refills: 0 | Status: DISCONTINUED | OUTPATIENT
Start: 2019-01-20 | End: 2019-01-23

## 2019-01-20 RX ORDER — ATORVASTATIN CALCIUM 80 MG/1
10 TABLET, FILM COATED ORAL AT BEDTIME
Qty: 0 | Refills: 0 | Status: DISCONTINUED | OUTPATIENT
Start: 2019-01-20 | End: 2019-01-23

## 2019-01-20 RX ORDER — POLYETHYLENE GLYCOL 3350 17 G/17G
17 POWDER, FOR SOLUTION ORAL
Qty: 0 | Refills: 0 | Status: DISCONTINUED | OUTPATIENT
Start: 2019-01-20 | End: 2019-01-23

## 2019-01-20 RX ORDER — TIMOLOL 0.5 %
1 DROPS OPHTHALMIC (EYE) DAILY
Qty: 0 | Refills: 0 | Status: DISCONTINUED | OUTPATIENT
Start: 2019-01-20 | End: 2019-01-23

## 2019-01-20 RX ORDER — ASPIRIN/CALCIUM CARB/MAGNESIUM 324 MG
81 TABLET ORAL DAILY
Qty: 0 | Refills: 0 | Status: DISCONTINUED | OUTPATIENT
Start: 2019-01-20 | End: 2019-01-23

## 2019-01-20 RX ORDER — LACTULOSE 10 G/15ML
20 SOLUTION ORAL EVERY 4 HOURS
Qty: 0 | Refills: 0 | Status: COMPLETED | OUTPATIENT
Start: 2019-01-20 | End: 2019-01-21

## 2019-01-20 RX ADMIN — Medication 10 MILLIGRAM(S): at 17:06

## 2019-01-20 RX ADMIN — LACTULOSE 20 GRAM(S): 10 SOLUTION ORAL at 22:33

## 2019-01-20 RX ADMIN — APIXABAN 10 MILLIGRAM(S): 2.5 TABLET, FILM COATED ORAL at 19:32

## 2019-01-20 RX ADMIN — Medication 50 MILLIGRAM(S): at 21:36

## 2019-01-20 NOTE — CONSULT NOTE ADULT - ASSESSMENT
83 year old M (poor historian), that has a history of HTN, HLD, glaucoma, ?a-fib (no documented ECG, but was on Eliquis in the past), and CAD s/p PCI x 2 (2006) that presents to the ED with chest pain. Likely in the setting of hypertension.    #Hypertensive urgency   - Give hydralazine 10 mg IV x1   - Start hydralazine 50 mg TID  - Restart home losartan     #Frequent APCs  - On review of previous admission at [facility], ECG there also had frequent APCs  - Continue with home metoprolol XL  - Check TTE    #Pulmonary embolism   - Suspect at this time that the pt symptoms are more due to elevated BP then the segmental PE  - Pt was on eliquis in the past, but he does not know why  - It may have been due to a-fib  - would restart Eliquis at this time.     #CAD  - C/w home ASA  - C/w metoprolol XL  - Check one more set of cardiac enzymes     Jacqueline Johnson MD  Cardiology Fellow - PGY-4  [facility]: [id number]  NS: [phone]  [id number] 83 year old M (poor historian), that has a history of HTN, HLD, glaucoma, ?a-fib (no documented ECG, but was on Eliquis in the past), and CAD s/p PCI x 2 (2006) that presents to the ED with chest pain. Likely in the setting of hypertension.    #Hypertensive urgency   - Give hydralazine 10 mg IV x1   - Start hydralazine 50 mg TID  - Restart home losartan (Cr seems to be at baseline from [facility] admission)    #Frequent APCs  - On review of previous admission at [facility], ECG there also had frequent APCs  - Continue with home metoprolol XL  - Check TTE    #Pulmonary embolism   - Suspect at this time that the pt symptoms are more due to elevated BP then the segmental PE  - Pt was on eliquis in the past, but he does not know why  - It may have been due to a-fib  - would restart Eliquis at this time.     #CAD  - C/w home ASA  - C/w metoprolol XL  - Check one more set of cardiac enzymes     Jacqueline Johnson MD  Cardiology Fellow - PGY-4  [facility]: [id number]  NS: [phone]  [id number]

## 2019-01-20 NOTE — ED ADULT NURSE NOTE - OBJECTIVE STATEMENT
83 year old A&Ox3 male presents to ED in wheelchair complaining of generalized abdominal pain for the past couple of weeks. Patient states he was felt CP last night but attributed it to gas pain. PMH HTN, 2 stents placed in 2006. Patient states he is constipated and went to PCP last month to see if he needed a colonoscopy. Patient confirms taking stool softeners and having a loose BM this morning, which has decreased his pain. Confirms intermittent frontal headaches radiating to posterior neck for the past couple of weeks. Denies current CP, SOB, n/v/d, fevers, chills, abdominal pain, weakness, fatigue, blurry vision. Patient able to move all extremities sensation intact. Patient's pulse is irregular in the 40s-50s MD Navarrete aware. Abdomen soft, nondistended, denies pain upon palpation, bowel sounds heard in all 4 quadrants.  EKg done cardiac monitor applied. Patient and family updated on plan of care. 83 year old A&Ox3 male presents to ED in wheelchair complaining of generalized abdominal pain for the past couple of weeks. Patient states he was felt CP last night but attributed it to gas pain. PMH HTN, 2 stents placed in 2006. Patient states he is constipated and went to PCP last month to see if he needed a colonoscopy. Patient confirms taking stool softeners and having a loose BM this morning, which has decreased his pain. Confirms intermittent frontal headaches radiating to posterior neck for the past couple of weeks. Denies current CP, SOB, n/v/d, fevers, chills, abdominal pain, weakness, fatigue, blurry vision. Patient able to move all extremities sensation intact. Patient's pulse is irregular in the 40s-50s, showing 70s on CM with frequent PVCs. MD Navarrete aware. Abdomen soft, nondistended, denies pain upon palpation, bowel sounds heard in all 4 quadrants, denies black tarry stools or bright red stools, or urinary symptoms.  EKg done cardiac monitor applied. Patient and family updated on plan of care.

## 2019-01-20 NOTE — CONSULT NOTE ADULT - SUBJECTIVE AND OBJECTIVE BOX
Patient seen and evaluated at bedside    Chief Complaint: CP and constipation     HPI:  Pt is a 83 year old M (poor historian), that has a history of HTN, HLD, glaucoma, ?a-fib (no documented ECG, but was on Eliquis in the past), and CAD s/p PCI x 2 (2006) that presents to the ED with chest pain. He states that the chest pain started the night prior to admission after eating. He took seltzer at home which relieved his symptoms, and then he went to sleep. When he awoke the pain was still there so he called his son who bought him into the hospital. He states that the pain has subsided for the most part, but he does have constipation.     Of note the pt has a different MRN when he was at [facility] for SOB. Work up there was unremarkable, and the pt was thought to be taking Eliquis at that time for a-fib.     PMHx:   CAD  HTN    PSHx:   Cath    Allergies:  No Known Allergies      Home Meds:  Losartan   famotidine   metoprolol  timolol  ASA  pravastatin       Social History: lives alone   Smoking History: former smoker   Alcohol Use: occasional   Drug Use: none     REVIEW OF SYSTEMS:  Constitutional:     [ ] negative [ ] fevers [ ] chills [ ] weight loss [ ] weight gain  HEENT:                  [ ] negative [ ] dry eyes [ ] eye irritation [ ] postnasal drip [ ] nasal congestion  CV:                         [ ] negative  [x ] chest pain [ ] orthopnea [ ] palpitations [ ] murmur  Resp:                     [ ] negative [ ] cough [ ] shortness of breath [ ] dyspnea [ ] wheezing [ ] sputum [ ]hemoptysis  GI:                          [ ] negative [ ] nausea [ ] vomiting [ ] diarrhea [x ] constipation [ ] abd pain [ ] dysphagia   :                        [ ] negative [ ] dysuria [ ] nocturia [ ] hematuria [ ] increased urinary frequency  Musculoskeletal: [ ] negative [ ] back pain [ ] myalgias [ ] arthralgias [ ] fracture  Skin:                       [ ] negative [ ] rash [ ] itch  Neurological:        [ ] negative [ ] headache [ ] dizziness [ ] syncope [ ] weakness [ ] numbness  Psychiatric:           [ ] negative [ ] anxiety [ ] depression  Endocrine:            [ ] negative [ ] diabetes [ ] thyroid problem  Heme/Lymph:      [ ] negative [ ] anemia [ ] bleeding problem  Allergic/Immune: [ ] negative [ ] itchy eyes [ ] nasal discharge [ ] hives [ ] angioedema    [x ] All other systems negative  [ ] Unable to assess ROS because sedated with anoxic brain injury.      Physical Exam:  T(F): 98.3 (01-20), Max: 98.3 (01-20)  HR: 55 (01-20) (47 - 58)  BP: 228/99 (01-20) (174/82 - 233/90)  RR: 18 (01-20)  SpO2: 98% (01-20)  GENERAL: No acute distress, well-developed  HEAD:  Atraumatic, Normocephalic  ENT: EOMI, PERRLA, conjunctiva and sclera clear, Neck supple, No JVD, moist mucosa  CHEST/LUNG: Clear to auscultation bilaterally; No wheeze, equal breath sounds bilaterally   BACK: No spinal tenderness  HEART: Regular rate and rhythm; No murmurs, rubs, or gallops  ABDOMEN: Soft, Nontender, Nondistended; Bowel sounds present  EXTREMITIES:  No clubbing, cyanosis, or edema  PSYCH: Nl behavior, nl affect  NEUROLOGY: AAOx3, non-focal, cranial nerves intact  SKIN: Normal color, No rashes or lesions  LINES:    Cardiovascular Diagnostic Testing:    ECG: Personally reviewed:  < from: CT Angio Abdomen and Pelvis w/ IV Cont (01.20.19 @ 15:39) >  PROCEDURE:   CT Angiography of the Chest, Abdomen and Pelvis.   Gated precontrast imaging was performed through the heart followed by   gated CT Angiography of the heart with subsequent non-gated arterial   phase imaging of the chest, abdomen and pelvis.  Intravenous contrast: 90 ml Omnipaque 350. 10 ml discarded.  Oral contrast:None.  Sagittal and coronal reformats were performed as well as 3D (MIP)   reconstructions.    FINDINGS:    CHEST:     LUNGS AND LARGE AIRWAYS: Patent central airways. No pulmonary nodules.   Emphysema.   PLEURA: No pleural effusion.  VESSELS: There is no aortic dissection. There is a filling defect within   segmental/subsegmental branch of a pulmonary artery in the superior   segment of the right lower lobe.  HEART: Heart size is normal.  No pericardial effusion.      MEDIASTINUM AND KELLEY: No lymphadenopathy.   CHEST WALL AND LOWER NECK: Within normal limits.     ABDOMEN AND PELVIS:    LIVER: Within normal limits.   BILE DUCTS: Normal caliber.  GALLBLADDER: Within normal limits.  SPLEEN: Within normal limits.   PANCREAS: Within normal limits.  ADRENALS: Within normal limits.   KIDNEYS/URETERS: Within normal limits.         BLADDER: Within normal limits.   REPRODUCTIVE ORGANS: The prostate is within normal limits.    BOWEL: No bowel obstruction. The appendix is normal.           PERITONEUM: No ascites.       VESSELS:  Within normal limits.  RETROPERITONEUM: No lymphadenopathy.     ABDOMINAL WALL: Within normal limits.  BONES: Within normal limits.     IMPRESSION: Right lower lobe pulmonary embolus.    Findings were discussed with Dr. Mike 4:00 PM on 1/20/2019.    < end of copied text >  sinus with frequent APCs with aberrant conduction     Imaging:      Labs: Personally reviewed                        14.6   5.4   )-----------( 183      ( 20 Jan 2019 14:13 )             42.6     01-20    140  |  104  |  15  ----------------------------<  110<H>  4.2   |  25  |  1.36<H>    Ca    9.0      20 Jan 2019 14:13  Phos  2.5     01-20  Mg     2.4     01-20    TPro  7.9  /  Alb  4.3  /  TBili  0.4  /  DBili  x   /  AST  17  /  ALT  14  /  AlkPhos  83  01-20    PT/INR - ( 20 Jan 2019 14:13 )   PT: 12.0 sec;   INR: 1.04 ratio         PTT - ( 20 Jan 2019 14:13 )  PTT:27.4 sec Patient seen and evaluated at bedside    Chief Complaint: CP and constipation     HPI:  Pt is a 83 year old M (poor historian), that has a history of HTN, HLD, glaucoma, ?a-fib (no documented ECG, but was on Eliquis in the past), and CAD s/p PCI x 2 (2006) that presents to the ED with chest pain. He states that the chest pain started the night prior to admission after eating. He took seltzer at home which relieved his symptoms, and then he went to sleep. When he awoke the pain was still there so he called his son who bought him into the hospital. He states that the pain has subsided for the most part, but he does have constipation.     Of note the pt has a different MRN when he was at [facility] for SOB. Work up there was unremarkable, and the pt was thought to be taking Eliquis at that time for a-fib.     PMHx:   CAD  HTN    PSHx:   Cath    Allergies:  No Known Allergies      Home Meds:  Losartan   famotidine   metoprolol  timolol  ASA  pravastatin       Social History: lives alone   Smoking History: former smoker   Alcohol Use: occasional   Drug Use: none     REVIEW OF SYSTEMS:  Constitutional:     [ ] negative [ ] fevers [ ] chills [ ] weight loss [ ] weight gain  HEENT:                  [ ] negative [ ] dry eyes [ ] eye irritation [ ] postnasal drip [ ] nasal congestion  CV:                         [ ] negative  [x ] chest pain [ ] orthopnea [ ] palpitations [ ] murmur  Resp:                     [ ] negative [ ] cough [ ] shortness of breath [ ] dyspnea [ ] wheezing [ ] sputum [ ]hemoptysis  GI:                          [ ] negative [ ] nausea [ ] vomiting [ ] diarrhea [x ] constipation [ ] abd pain [ ] dysphagia   :                        [ ] negative [ ] dysuria [ ] nocturia [ ] hematuria [ ] increased urinary frequency  Musculoskeletal: [ ] negative [ ] back pain [ ] myalgias [ ] arthralgias [ ] fracture  Skin:                       [ ] negative [ ] rash [ ] itch  Neurological:        [ ] negative [ ] headache [ ] dizziness [ ] syncope [ ] weakness [ ] numbness  Psychiatric:           [ ] negative [ ] anxiety [ ] depression  Endocrine:            [ ] negative [ ] diabetes [ ] thyroid problem  Heme/Lymph:      [ ] negative [ ] anemia [ ] bleeding problem  Allergic/Immune: [ ] negative [ ] itchy eyes [ ] nasal discharge [ ] hives [ ] angioedema    [x ] All other systems negative  [ ] Unable to assess ROS because sedated with anoxic brain injury.      Physical Exam:  T(F): 98.3 (01-20), Max: 98.3 (01-20)  HR: 55 (01-20) (47 - 58)  BP: 228/99 (01-20) (174/82 - 233/90)  RR: 18 (01-20)  SpO2: 98% (01-20)  GENERAL: No acute distress, well-developed  HEAD:  Atraumatic, Normocephalic  ENT: EOMI, PERRLA, conjunctiva and sclera clear, Neck supple, No JVD, moist mucosa  CHEST/LUNG: Clear to auscultation bilaterally; No wheeze, equal breath sounds bilaterally   BACK: No spinal tenderness  HEART: Regular rate and rhythm; No murmurs, rubs, or gallops  ABDOMEN: Soft, Nontender, Nondistended; Bowel sounds present  EXTREMITIES:  No clubbing, cyanosis, or edema  PSYCH: Nl behavior, nl affect  NEUROLOGY: AAOx3, non-focal, cranial nerves intact  SKIN: Normal color, No rashes or lesions  LINES:    Cardiovascular Diagnostic Testing:  ------------------------------------------------------------------------  CONCLUSIONS:  1. Mitral annular calcification, otherwise normal mitral  valve. Mild mitral regurgitation.  2. Normal left ventricular internal dimensions and wall  thicknesses.  3. Endocardium not well visualized; grossly normal left  ventricular systolic function.  4. The right ventricle is not well visualized; grossly  normal right ventricular systolic function.  5. Estimated right ventricular systolic pressure equals 38  mm Hg, assuming right atrial pressure equals 10 mm Hg,  consistent with borderline pulmonary hypertension.  ------------------------------------------------------------------------  Confirmed on  1/22/2018 - 16:05:40 by Wilbur [place], M.D.  ------------------------------------------------------------------------  ECG: Personally reviewed:  < from: CT Angio Abdomen and Pelvis w/ IV Cont (01.20.19 @ 15:39) >  PROCEDURE:   CT Angiography of the Chest, Abdomen and Pelvis.   Gated precontrast imaging was performed through the heart followed by   gated CT Angiography of the heart with subsequent non-gated arterial   phase imaging of the chest, abdomen and pelvis.  Intravenous contrast: 90 ml Omnipaque 350. 10 ml discarded.  Oral contrast:None.  Sagittal and coronal reformats were performed as well as 3D (MIP)   reconstructions.    FINDINGS:    CHEST:     LUNGS AND LARGE AIRWAYS: Patent central airways. No pulmonary nodules.   Emphysema.   PLEURA: No pleural effusion.  VESSELS: There is no aortic dissection. There is a filling defect within   segmental/subsegmental branch of a pulmonary artery in the superior   segment of the right lower lobe.  HEART: Heart size is normal.  No pericardial effusion.      MEDIASTINUM AND KELLEY: No lymphadenopathy.   CHEST WALL AND LOWER NECK: Within normal limits.     ABDOMEN AND PELVIS:    LIVER: Within normal limits.   BILE DUCTS: Normal caliber.  GALLBLADDER: Within normal limits.  SPLEEN: Within normal limits.   PANCREAS: Within normal limits.  ADRENALS: Within normal limits.   KIDNEYS/URETERS: Within normal limits.         BLADDER: Within normal limits.   REPRODUCTIVE ORGANS: The prostate is within normal limits.    BOWEL: No bowel obstruction. The appendix is normal.           PERITONEUM: No ascites.       VESSELS:  Within normal limits.  RETROPERITONEUM: No lymphadenopathy.     ABDOMINAL WALL: Within normal limits.  BONES: Within normal limits.     IMPRESSION: Right lower lobe pulmonary embolus.    Findings were discussed with Dr. Mike 4:00 PM on 1/20/2019.    < end of copied text >  sinus with frequent APCs with aberrant conduction     Imaging:      Labs: Personally reviewed                        14.6   5.4   )-----------( 183      ( 20 Jan 2019 14:13 )             42.6     01-20    140  |  104  |  15  ----------------------------<  110<H>  4.2   |  25  |  1.36<H>    Ca    9.0      20 Jan 2019 14:13  Phos  2.5     01-20  Mg     2.4     01-20    TPro  7.9  /  Alb  4.3  /  TBili  0.4  /  DBili  x   /  AST  17  /  ALT  14  /  AlkPhos  83  01-20    PT/INR - ( 20 Jan 2019 14:13 )   PT: 12.0 sec;   INR: 1.04 ratio         PTT - ( 20 Jan 2019 14:13 )  PTT:27.4 sec

## 2019-01-20 NOTE — ED ADULT NURSE REASSESSMENT NOTE - NS ED NURSE REASSESS COMMENT FT1
Patient assisted to bathroom with steady gait. Cardiology at bedside for evaluation. Denies current HA, dizziness, CP or palpitations. MD Navarrete at bedside to discuss plan of care.

## 2019-01-20 NOTE — ED PROVIDER NOTE - PHYSICAL EXAMINATION
Porsche Navarrete M.D.:   patient awake alert seen lying on stretcher NAD .   LUNGS CTAB no wheeze no crackle.   CARD irregular no m/r/g.    Abdomen soft minimal periumbilical tenderness ND no rebound no guarding no CVA tenderness.   EXT WWP no edema no calf tenderness CV 2+DP/PT bilaterally.   neuro A&Ox3 cn 2-12 intact, gross motor and sensory intact in all extremities gait normal.    skin warm and dry no rash  HEENT: moist mucous membranes, PERRL, EOMI

## 2019-01-20 NOTE — ED ADULT NURSE REASSESSMENT NOTE - NS ED NURSE REASSESS COMMENT FT1
Patient waiting for bed upstairs. VSS, /70, palpated radial pulse 52bpm, CM reading 65bpm. Patient denies CP, SOB, n/v/d, fevers, chills, abdominal pain, HA, blurry vision. Patient resting comfortably in stretcher and family updated on plan of care.

## 2019-01-20 NOTE — ED ADULT NURSE NOTE - CCCP TRG CHIEF CMPLNT
epigastric  pain,near  syncope, constipation/abrasion epigastric  pain,near  syncope, constipation/medical evaluation

## 2019-01-20 NOTE — CONSULT NOTE ADULT - ATTENDING COMMENTS
Eliquis for PE  Continue to monitor BP  Continue hydralazine and losartan  Slow downtitration of BP over the next 24-48 hours    [place] [id number]

## 2019-01-20 NOTE — ED PROVIDER NOTE - ATTENDING CONTRIBUTION TO CARE
****ATTENDING**** 82yo m hx HTN, CAD BIB family for chest pain. States that he has had trouble managing his high Bp and has had recent changes to his medications. Now feels HA, chest and abdominal pain. States symptoms have been present for 2 days. No recent trauma or fall. No fever or chills.   On exam, Patient is awake,alert,oriented x 3. Patient is well appearing and in no acute distress. PERRL. Patient's chest is clear to ausculation, +s1s2. Abdomen is soft nd/nt +BS. Extremity with no swelling or calf tenderness. 2+ DP B/L. Pt neuro intact. PERRL 4mm b/l.   Check Labs, CTA to ro dissection.   EKG w bigeminy in ED. Pt b/l BP equal w good distal pulses.

## 2019-01-20 NOTE — H&P ADULT - NSHPLABSRESULTS_GEN_ALL_CORE
LABS:                        14.6   5.4   )-----------( 183      ( 20 Jan 2019 14:13 )             42.6     01-20    140  |  104  |  15  ----------------------------<  110<H>  4.2   |  25  |  1.36<H>    Ca    9.0      20 Jan 2019 14:13  Phos  2.5     01-20  Mg     2.4     01-20    TPro  7.9  /  Alb  4.3  /  TBili  0.4  /  DBili  x   /  AST  17  /  ALT  14  /  AlkPhos  83  01-20    PT/INR - ( 20 Jan 2019 14:13 )   PT: 12.0 sec;   INR: 1.04 ratio         PTT - ( 20 Jan 2019 14:13 )  PTT:27.4 sec          RADIOLOGY & ADDITIONAL TESTS:

## 2019-01-20 NOTE — H&P ADULT - ASSESSMENT
83M hx HTN, CAD, recent changes to BP meds, p/w multiple complaints- notes a few weeks of intermittent abd pain, vague, aching in nature. also during this time has been having intermittent frontal headaches which are new. in the last few days has started with intermittent substernal chest pressure/burning with feelings of lightheadedness. no sob no n/v. +constipation. no falls no fevrs/chills.    pulm embolism  - start eliquis  - check qing le doppler    hypertensive urgency  - cw cozaar  - add hydralazine 50 tid per cardiology    cad  - cw asa    constipation  - lactulose x 2 doses  - start miralax

## 2019-01-20 NOTE — ED PROVIDER NOTE - CARE PLAN
Principal Discharge DX:	Pulmonary embolism  Secondary Diagnosis:	Chest pain  Secondary Diagnosis:	Hypertensive urgency  Secondary Diagnosis:	Bigeminy

## 2019-01-20 NOTE — ED PROVIDER NOTE - PROGRESS NOTE DETAILS
labs and imaging notable for a segmental PE. discussed with cardiology- will admit for further BP management, PE management, and possible EP eval. endorsed to Dr Gonsalez who is accepting pt and requesting eliquis for PE.

## 2019-01-20 NOTE — ED ADULT TRIAGE NOTE - CCCP TRG CHIEF CMPLNT
abrasion/epigastric  pain,near  syncope, constipation medical evaluation/epigastric  pain,near  syncope, constipation

## 2019-01-20 NOTE — ED PROVIDER NOTE - OBJECTIVE STATEMENT
Porsche Navarrete M.D: 83M hx HTN, CAD, recent changes to BP meds, p/w multiple complaints- notes a few weeks of intermittent abd pain, vague, aching in nature. also during this time has been having itnermittent frontal headaches which are new. in the last few days has started with intermittent substernal chest pressure/burning with feelings of lightheadedness. no sob no n/v. +constipation. no falls no feevrs/chills.

## 2019-01-21 LAB
ANION GAP SERPL CALC-SCNC: 13 MMOL/L — SIGNIFICANT CHANGE UP (ref 5–17)
BUN SERPL-MCNC: 14 MG/DL — SIGNIFICANT CHANGE UP (ref 7–23)
CALCIUM SERPL-MCNC: 9 MG/DL — SIGNIFICANT CHANGE UP (ref 8.4–10.5)
CHLORIDE SERPL-SCNC: 103 MMOL/L — SIGNIFICANT CHANGE UP (ref 96–108)
CO2 SERPL-SCNC: 24 MMOL/L — SIGNIFICANT CHANGE UP (ref 22–31)
CREAT SERPL-MCNC: 1.29 MG/DL — SIGNIFICANT CHANGE UP (ref 0.5–1.3)
FOLATE SERPL-MCNC: 14.1 NG/ML — SIGNIFICANT CHANGE UP
GLUCOSE SERPL-MCNC: 104 MG/DL — HIGH (ref 70–99)
HCT VFR BLD CALC: 42.7 % — SIGNIFICANT CHANGE UP (ref 39–50)
HGB BLD-MCNC: 14.2 G/DL — SIGNIFICANT CHANGE UP (ref 13–17)
MAGNESIUM SERPL-MCNC: 2.4 MG/DL — SIGNIFICANT CHANGE UP (ref 1.6–2.6)
MCHC RBC-ENTMCNC: 29 PG — SIGNIFICANT CHANGE UP (ref 27–34)
MCHC RBC-ENTMCNC: 33.3 GM/DL — SIGNIFICANT CHANGE UP (ref 32–36)
MCV RBC AUTO: 87.3 FL — SIGNIFICANT CHANGE UP (ref 80–100)
PHOSPHATE SERPL-MCNC: 3.3 MG/DL — SIGNIFICANT CHANGE UP (ref 2.5–4.5)
PLATELET # BLD AUTO: 174 K/UL — SIGNIFICANT CHANGE UP (ref 150–400)
POTASSIUM SERPL-MCNC: 3.7 MMOL/L — SIGNIFICANT CHANGE UP (ref 3.5–5.3)
POTASSIUM SERPL-SCNC: 3.7 MMOL/L — SIGNIFICANT CHANGE UP (ref 3.5–5.3)
RBC # BLD: 4.89 M/UL — SIGNIFICANT CHANGE UP (ref 4.2–5.8)
RBC # FLD: 13.9 % — SIGNIFICANT CHANGE UP (ref 10.3–14.5)
SODIUM SERPL-SCNC: 140 MMOL/L — SIGNIFICANT CHANGE UP (ref 135–145)
TSH SERPL-MCNC: 2.41 UIU/ML — SIGNIFICANT CHANGE UP (ref 0.27–4.2)
VIT B12 SERPL-MCNC: 210 PG/ML — LOW (ref 232–1245)
WBC # BLD: 5.11 K/UL — SIGNIFICANT CHANGE UP (ref 3.8–10.5)
WBC # FLD AUTO: 5.11 K/UL — SIGNIFICANT CHANGE UP (ref 3.8–10.5)

## 2019-01-21 PROCEDURE — 99223 1ST HOSP IP/OBS HIGH 75: CPT

## 2019-01-21 RX ORDER — ACETAMINOPHEN 500 MG
650 TABLET ORAL ONCE
Qty: 0 | Refills: 0 | Status: COMPLETED | OUTPATIENT
Start: 2019-01-21 | End: 2019-01-21

## 2019-01-21 RX ORDER — PREGABALIN 225 MG/1
1000 CAPSULE ORAL DAILY
Qty: 0 | Refills: 0 | Status: COMPLETED | OUTPATIENT
Start: 2019-01-21 | End: 2019-01-23

## 2019-01-21 RX ADMIN — APIXABAN 10 MILLIGRAM(S): 2.5 TABLET, FILM COATED ORAL at 17:55

## 2019-01-21 RX ADMIN — PREGABALIN 1000 MICROGRAM(S): 225 CAPSULE ORAL at 17:55

## 2019-01-21 RX ADMIN — Medication 50 MILLIGRAM(S): at 13:20

## 2019-01-21 RX ADMIN — ATORVASTATIN CALCIUM 10 MILLIGRAM(S): 80 TABLET, FILM COATED ORAL at 21:17

## 2019-01-21 RX ADMIN — LOSARTAN POTASSIUM 100 MILLIGRAM(S): 100 TABLET, FILM COATED ORAL at 05:48

## 2019-01-21 RX ADMIN — Medication 50 MILLIGRAM(S): at 17:55

## 2019-01-21 RX ADMIN — APIXABAN 10 MILLIGRAM(S): 2.5 TABLET, FILM COATED ORAL at 05:48

## 2019-01-21 RX ADMIN — Medication 50 MILLIGRAM(S): at 21:17

## 2019-01-21 RX ADMIN — Medication 650 MILLIGRAM(S): at 10:18

## 2019-01-21 RX ADMIN — Medication 50 MILLIGRAM(S): at 05:48

## 2019-01-21 RX ADMIN — Medication 81 MILLIGRAM(S): at 10:18

## 2019-01-21 NOTE — PROGRESS NOTE ADULT - ASSESSMENT
83M hx HTN, CAD, recent changes to BP meds, p/w multiple complaints- notes a few weeks of intermittent abd pain, vague, aching in nature. also during this time has been having intermittent frontal headaches which are new. in the last few days has started with intermittent substernal chest pressure/burning with feelings of lightheadedness. no sob no n/v. +constipation. no falls no fevrs/chills.    pulm embolism  -  eliquis  - check qing le doppler    hypertensive urgency  - cw cozaar  - add hydralazine 50 tid per cardiology    cad  - cw asa    constipation  - lactulose x 2 doses  - start miralax

## 2019-01-21 NOTE — PROGRESS NOTE ADULT - SUBJECTIVE AND OBJECTIVE BOX
Patient is a 83y old  Male who presents with a chief complaint of chest pain (20 Jan 2019 21:35)      SUBJECTIVE / OVERNIGHT EVENTS:  No chest pain. No shortness of breath. No complaints. No events overnight.     MEDICATIONS  (STANDING):  acetaminophen   Tablet .. 650 milliGRAM(s) Oral once  apixaban 10 milliGRAM(s) Oral every 12 hours  aspirin enteric coated 81 milliGRAM(s) Oral daily  atorvastatin 10 milliGRAM(s) Oral at bedtime  hydrALAZINE 50 milliGRAM(s) Oral every 8 hours  losartan 100 milliGRAM(s) Oral daily  metoprolol succinate ER 50 milliGRAM(s) Oral daily  polyethylene glycol 3350 17 Gram(s) Oral two times a day  timolol 0.25% Solution 1 Drop(s) Both EYES daily    MEDICATIONS  (PRN):      Vital Signs Last 24 Hrs  T(C): 36.5 (21 Jan 2019 08:38), Max: 36.8 (20 Jan 2019 13:48)  T(F): 97.7 (21 Jan 2019 08:38), Max: 98.3 (20 Jan 2019 13:48)  HR: 51 (21 Jan 2019 08:38) (45 - 80)  BP: 152/77 (21 Jan 2019 08:38) (152/77 - 233/90)  BP(mean): --  RR: 18 (21 Jan 2019 08:38) (15 - 22)  SpO2: 98% (21 Jan 2019 08:38) (95% - 99%)  CAPILLARY BLOOD GLUCOSE        I&O's Summary    20 Jan 2019 07:01  -  21 Jan 2019 07:00  --------------------------------------------------------  IN: 0 mL / OUT: 375 mL / NET: -375 mL        PHYSICAL EXAM:  GENERAL: NAD, well-developed  HEAD:  Atraumatic, Normocephalic  EYES: EOMI, PERRLA, conjunctiva and sclera clear  NECK: Supple, No JVD  CHEST/LUNG: Clear to auscultation bilaterally; No wheeze  HEART: Regular rate and rhythm; No murmurs, rubs, or gallops  ABDOMEN: Soft, Nontender, Nondistended; Bowel sounds present  EXTREMITIES:  2+ Peripheral Pulses, No clubbing, cyanosis, or edema  PSYCH: AAOx3  NEUROLOGY: non-focal  SKIN: No rashes or lesions    LABS:                        14.2   5.11  )-----------( 174      ( 21 Jan 2019 09:14 )             42.7     01-21    140  |  103  |  14  ----------------------------<  104<H>  3.7   |  24  |  1.29    Ca    9.0      21 Jan 2019 08:35  Phos  3.3     01-21  Mg     2.4     01-21    TPro  7.9  /  Alb  4.3  /  TBili  0.4  /  DBili  x   /  AST  17  /  ALT  14  /  AlkPhos  83  01-20    PT/INR - ( 20 Jan 2019 14:13 )   PT: 12.0 sec;   INR: 1.04 ratio         PTT - ( 20 Jan 2019 14:13 )  PTT:27.4 sec          RADIOLOGY & ADDITIONAL TESTS:    Imaging Personally Reviewed:    Consultant(s) Notes Reviewed:      Care Discussed with Consultants/Other Providers:

## 2019-01-22 PROBLEM — I10 ESSENTIAL (PRIMARY) HYPERTENSION: Chronic | Status: ACTIVE | Noted: 2018-01-21

## 2019-01-22 PROBLEM — E78.5 HYPERLIPIDEMIA, UNSPECIFIED: Chronic | Status: ACTIVE | Noted: 2018-01-21

## 2019-01-22 PROBLEM — I25.10 ATHEROSCLEROTIC HEART DISEASE OF NATIVE CORONARY ARTERY WITHOUT ANGINA PECTORIS: Chronic | Status: ACTIVE | Noted: 2018-01-21

## 2019-01-22 LAB
ANION GAP SERPL CALC-SCNC: 13 MMOL/L — SIGNIFICANT CHANGE UP (ref 5–17)
BUN SERPL-MCNC: 18 MG/DL — SIGNIFICANT CHANGE UP (ref 7–23)
CALCIUM SERPL-MCNC: 8.9 MG/DL — SIGNIFICANT CHANGE UP (ref 8.4–10.5)
CHLORIDE SERPL-SCNC: 105 MMOL/L — SIGNIFICANT CHANGE UP (ref 96–108)
CO2 SERPL-SCNC: 23 MMOL/L — SIGNIFICANT CHANGE UP (ref 22–31)
CREAT SERPL-MCNC: 1.39 MG/DL — HIGH (ref 0.5–1.3)
GLUCOSE SERPL-MCNC: 107 MG/DL — HIGH (ref 70–99)
HCT VFR BLD CALC: 42 % — SIGNIFICANT CHANGE UP (ref 39–50)
HGB BLD-MCNC: 14.3 G/DL — SIGNIFICANT CHANGE UP (ref 13–17)
MAGNESIUM SERPL-MCNC: 2.2 MG/DL — SIGNIFICANT CHANGE UP (ref 1.6–2.6)
MCHC RBC-ENTMCNC: 29.9 PG — SIGNIFICANT CHANGE UP (ref 27–34)
MCHC RBC-ENTMCNC: 34.1 GM/DL — SIGNIFICANT CHANGE UP (ref 32–36)
MCV RBC AUTO: 87.6 FL — SIGNIFICANT CHANGE UP (ref 80–100)
PLATELET # BLD AUTO: 169 K/UL — SIGNIFICANT CHANGE UP (ref 150–400)
POTASSIUM SERPL-MCNC: 3.6 MMOL/L — SIGNIFICANT CHANGE UP (ref 3.5–5.3)
POTASSIUM SERPL-SCNC: 3.6 MMOL/L — SIGNIFICANT CHANGE UP (ref 3.5–5.3)
RBC # BLD: 4.8 M/UL — SIGNIFICANT CHANGE UP (ref 4.2–5.8)
RBC # FLD: 13.2 % — SIGNIFICANT CHANGE UP (ref 10.3–14.5)
SODIUM SERPL-SCNC: 141 MMOL/L — SIGNIFICANT CHANGE UP (ref 135–145)
WBC # BLD: 5.8 K/UL — SIGNIFICANT CHANGE UP (ref 3.8–10.5)
WBC # FLD AUTO: 5.8 K/UL — SIGNIFICANT CHANGE UP (ref 3.8–10.5)

## 2019-01-22 PROCEDURE — 93970 EXTREMITY STUDY: CPT | Mod: 26

## 2019-01-22 PROCEDURE — 93306 TTE W/DOPPLER COMPLETE: CPT | Mod: 26

## 2019-01-22 RX ORDER — TIMOLOL 0.5 %
1 DROPS OPHTHALMIC (EYE)
Qty: 0 | Refills: 0 | COMMUNITY

## 2019-01-22 RX ORDER — LOSARTAN POTASSIUM 100 MG/1
1 TABLET, FILM COATED ORAL
Qty: 0 | Refills: 0 | COMMUNITY

## 2019-01-22 RX ORDER — METOPROLOL TARTRATE 50 MG
1 TABLET ORAL
Qty: 0 | Refills: 0 | COMMUNITY

## 2019-01-22 RX ORDER — ASPIRIN/CALCIUM CARB/MAGNESIUM 324 MG
1 TABLET ORAL
Qty: 0 | Refills: 0 | COMMUNITY

## 2019-01-22 RX ORDER — FAMOTIDINE 10 MG/ML
1 INJECTION INTRAVENOUS
Qty: 0 | Refills: 0 | COMMUNITY

## 2019-01-22 RX ORDER — CHOLECALCIFEROL (VITAMIN D3) 125 MCG
0 CAPSULE ORAL
Qty: 0 | Refills: 0 | COMMUNITY

## 2019-01-22 RX ADMIN — Medication 50 MILLIGRAM(S): at 05:22

## 2019-01-22 RX ADMIN — POLYETHYLENE GLYCOL 3350 17 GRAM(S): 17 POWDER, FOR SOLUTION ORAL at 05:22

## 2019-01-22 RX ADMIN — Medication 50 MILLIGRAM(S): at 21:17

## 2019-01-22 RX ADMIN — Medication 1 DROP(S): at 17:38

## 2019-01-22 RX ADMIN — LOSARTAN POTASSIUM 100 MILLIGRAM(S): 100 TABLET, FILM COATED ORAL at 05:22

## 2019-01-22 RX ADMIN — Medication 50 MILLIGRAM(S): at 14:18

## 2019-01-22 RX ADMIN — Medication 81 MILLIGRAM(S): at 14:18

## 2019-01-22 RX ADMIN — PREGABALIN 1000 MICROGRAM(S): 225 CAPSULE ORAL at 14:18

## 2019-01-22 RX ADMIN — APIXABAN 10 MILLIGRAM(S): 2.5 TABLET, FILM COATED ORAL at 17:38

## 2019-01-22 RX ADMIN — APIXABAN 10 MILLIGRAM(S): 2.5 TABLET, FILM COATED ORAL at 05:22

## 2019-01-22 RX ADMIN — POLYETHYLENE GLYCOL 3350 17 GRAM(S): 17 POWDER, FOR SOLUTION ORAL at 17:38

## 2019-01-22 RX ADMIN — ATORVASTATIN CALCIUM 10 MILLIGRAM(S): 80 TABLET, FILM COATED ORAL at 21:17

## 2019-01-22 NOTE — CHART NOTE - NSCHARTNOTEFT_GEN_A_CORE
MANJINDER GARCES    Notified by RN patient with critical result  from radiology. PT with positive DVT in right femoral , popliteal and trunk.       Interventions taken: Attending Dr. Gonsalez notified. Pt previously prescribed Eliquis and self dc'd secondary to price of medications. Pt currently prescribed eliquis. No additional intervention needed at this time.         Sydnie MASCORRO (Medicine PA )

## 2019-01-22 NOTE — PROGRESS NOTE ADULT - SUBJECTIVE AND OBJECTIVE BOX
Patient is a 83y old  Male who presents with a chief complaint of chest pain (21 Jan 2019 10:13)      SUBJECTIVE / OVERNIGHT EVENTS:  No chest pain. No shortness of breath. No complaints. No events overnight.     MEDICATIONS  (STANDING):  apixaban 10 milliGRAM(s) Oral every 12 hours  aspirin enteric coated 81 milliGRAM(s) Oral daily  atorvastatin 10 milliGRAM(s) Oral at bedtime  cyanocobalamin Injectable 1000 MICROGram(s) IntraMuscular daily  hydrALAZINE 50 milliGRAM(s) Oral every 8 hours  losartan 100 milliGRAM(s) Oral daily  metoprolol succinate ER 50 milliGRAM(s) Oral daily  polyethylene glycol 3350 17 Gram(s) Oral two times a day  timolol 0.25% Solution 1 Drop(s) Both EYES daily    MEDICATIONS  (PRN):      Vital Signs Last 24 Hrs  T(C): 36.7 (22 Jan 2019 04:56), Max: 36.7 (21 Jan 2019 21:13)  T(F): 98 (22 Jan 2019 04:56), Max: 98 (21 Jan 2019 21:13)  HR: 86 (22 Jan 2019 14:41) (56 - 86)  BP: 168/68 (22 Jan 2019 14:41) (138/74 - 173/55)  BP(mean): --  RR: 18 (22 Jan 2019 04:56) (18 - 18)  SpO2: 96% (22 Jan 2019 04:56) (96% - 98%)  CAPILLARY BLOOD GLUCOSE        I&O's Summary    22 Jan 2019 07:01  -  22 Jan 2019 20:17  --------------------------------------------------------  IN: 360 mL / OUT: 200 mL / NET: 160 mL        PHYSICAL EXAM:  GENERAL: NAD, well-developed  HEAD:  Atraumatic, Normocephalic  EYES: EOMI, PERRLA, conjunctiva and sclera clear  NECK: Supple, No JVD  CHEST/LUNG: Clear to auscultation bilaterally; No wheeze  HEART: Regular rate and rhythm; No murmurs, rubs, or gallops  ABDOMEN: Soft, Nontender, Nondistended; Bowel sounds present  EXTREMITIES:  2+ Peripheral Pulses, No clubbing, cyanosis, or edema  PSYCH: AAOx3  NEUROLOGY: non-focal  SKIN: No rashes or lesions    LABS:                        14.3   5.8   )-----------( 169      ( 22 Jan 2019 06:45 )             42.0     01-22    141  |  105  |  18  ----------------------------<  107<H>  3.6   |  23  |  1.39<H>    Ca    8.9      22 Jan 2019 06:43  Phos  3.3     01-21  Mg     2.2     01-22                RADIOLOGY & ADDITIONAL TESTS:    Imaging Personally Reviewed:    < from: [facility] Duplex Lower Ext Vein Scan, [place] (01.22.19 @ 14:18) >  IMPRESSION: There is acute, partially occlusive DVT affecting the right   posterior tibial peroneal trunk, extending through the popliteal vein   into the right femoral vein in the distal calf.        < end of copied text >    Consultant(s) Notes Reviewed:      Care Discussed with Consultants/Other Providers:

## 2019-01-22 NOTE — PROVIDER CONTACT NOTE (OTHER) - ASSESSMENT
Pt a&ox3, no signs of distress, denies chest pain/n/v/d 148/66, HR 51, 97% RA 18RR
Patient lying in bed, asymptomatic, denies chest pain, dizziness, palpitations, SOB. V/S /55, HR 68, RR 18, Temp 98.0, RR 18, O2 sat 98% on room air. Patient previously medicated with hydralazine 50 mg po.

## 2019-01-22 NOTE — PROGRESS NOTE ADULT - ASSESSMENT
83M hx HTN, CAD, recent changes to BP meds, p/w multiple complaints- notes a few weeks of intermittent abd pain, vague, aching in nature. also during this time has been having intermittent frontal headaches which are new. in the last few days has started with intermittent substernal chest pressure/burning with feelings of lightheadedness. no sob no n/v. +constipation. no falls no fevrs/chills.    pulm embolism  -  eliquis  -  le doppler pos for DVT  - pulm eval    hypertensive urgency  - cw cozaar  - add hydralazine 50 tid per cardiology    cad  - cw asa    constipation  - start miralax

## 2019-01-22 NOTE — PROVIDER CONTACT NOTE (OTHER) - ACTION/TREATMENT ORDERED:
ADAN Sampson aware, MD Gonsalez aware, cardiology on board, no additional orders at this time, cardiology to follow up, cont to monitor
No additional medication at this time. Administered AM meds as ordered. Continue to monitor.

## 2019-01-23 VITALS
HEART RATE: 57 BPM | DIASTOLIC BLOOD PRESSURE: 71 MMHG | OXYGEN SATURATION: 97 % | SYSTOLIC BLOOD PRESSURE: 156 MMHG | TEMPERATURE: 98 F | RESPIRATION RATE: 18 BRPM

## 2019-01-23 PROCEDURE — 84484 ASSAY OF TROPONIN QUANT: CPT

## 2019-01-23 PROCEDURE — 83735 ASSAY OF MAGNESIUM: CPT

## 2019-01-23 PROCEDURE — 84132 ASSAY OF SERUM POTASSIUM: CPT

## 2019-01-23 PROCEDURE — 82947 ASSAY GLUCOSE BLOOD QUANT: CPT

## 2019-01-23 PROCEDURE — 80053 COMPREHEN METABOLIC PANEL: CPT

## 2019-01-23 PROCEDURE — 84295 ASSAY OF SERUM SODIUM: CPT

## 2019-01-23 PROCEDURE — 82435 ASSAY OF BLOOD CHLORIDE: CPT

## 2019-01-23 PROCEDURE — 85610 PROTHROMBIN TIME: CPT

## 2019-01-23 PROCEDURE — 74174 CTA ABD&PLVS W/CONTRAST: CPT

## 2019-01-23 PROCEDURE — 85730 THROMBOPLASTIN TIME PARTIAL: CPT

## 2019-01-23 PROCEDURE — 84443 ASSAY THYROID STIM HORMONE: CPT

## 2019-01-23 PROCEDURE — 86900 BLOOD TYPING SEROLOGIC ABO: CPT

## 2019-01-23 PROCEDURE — 80048 BASIC METABOLIC PNL TOTAL CA: CPT

## 2019-01-23 PROCEDURE — 82607 VITAMIN B-12: CPT

## 2019-01-23 PROCEDURE — 93306 TTE W/DOPPLER COMPLETE: CPT

## 2019-01-23 PROCEDURE — 82803 BLOOD GASES ANY COMBINATION: CPT

## 2019-01-23 PROCEDURE — 70450 CT HEAD/BRAIN W/O DYE: CPT

## 2019-01-23 PROCEDURE — 85027 COMPLETE CBC AUTOMATED: CPT

## 2019-01-23 PROCEDURE — 99285 EMERGENCY DEPT VISIT HI MDM: CPT | Mod: 25

## 2019-01-23 PROCEDURE — 86850 RBC ANTIBODY SCREEN: CPT

## 2019-01-23 PROCEDURE — 84100 ASSAY OF PHOSPHORUS: CPT

## 2019-01-23 PROCEDURE — 86901 BLOOD TYPING SEROLOGIC RH(D): CPT

## 2019-01-23 PROCEDURE — 93970 EXTREMITY STUDY: CPT

## 2019-01-23 PROCEDURE — 82330 ASSAY OF CALCIUM: CPT

## 2019-01-23 PROCEDURE — 71275 CT ANGIOGRAPHY CHEST: CPT

## 2019-01-23 PROCEDURE — 82746 ASSAY OF FOLIC ACID SERUM: CPT

## 2019-01-23 PROCEDURE — 85014 HEMATOCRIT: CPT

## 2019-01-23 PROCEDURE — 96374 THER/PROPH/DIAG INJ IV PUSH: CPT | Mod: XU

## 2019-01-23 PROCEDURE — 99232 SBSQ HOSP IP/OBS MODERATE 35: CPT

## 2019-01-23 PROCEDURE — 71045 X-RAY EXAM CHEST 1 VIEW: CPT

## 2019-01-23 PROCEDURE — 83605 ASSAY OF LACTIC ACID: CPT

## 2019-01-23 RX ORDER — VALSARTAN 80 MG/1
1 TABLET ORAL
Qty: 0 | Refills: 0 | COMMUNITY

## 2019-01-23 RX ORDER — NIFEDIPINE 30 MG
60 TABLET, EXTENDED RELEASE 24 HR ORAL DAILY
Qty: 0 | Refills: 0 | Status: DISCONTINUED | OUTPATIENT
Start: 2019-01-23 | End: 2019-01-23

## 2019-01-23 RX ORDER — TIMOLOL 0.5 %
1 DROPS OPHTHALMIC (EYE)
Qty: 0 | Refills: 0 | COMMUNITY

## 2019-01-23 RX ORDER — APIXABAN 2.5 MG/1
1 TABLET, FILM COATED ORAL
Qty: 60 | Refills: 0 | OUTPATIENT
Start: 2019-01-23 | End: 2019-02-21

## 2019-01-23 RX ORDER — APIXABAN 2.5 MG/1
2 TABLET, FILM COATED ORAL
Qty: 120 | Refills: 0 | OUTPATIENT
Start: 2019-01-23 | End: 2019-02-21

## 2019-01-23 RX ORDER — FAMOTIDINE 10 MG/ML
1 INJECTION INTRAVENOUS
Qty: 0 | Refills: 0 | COMMUNITY

## 2019-01-23 RX ORDER — NIFEDIPINE 30 MG
1 TABLET, EXTENDED RELEASE 24 HR ORAL
Qty: 0 | Refills: 0 | COMMUNITY

## 2019-01-23 RX ORDER — METOPROLOL TARTRATE 50 MG
1 TABLET ORAL
Qty: 0 | Refills: 0 | COMMUNITY

## 2019-01-23 RX ORDER — APIXABAN 2.5 MG/1
1 TABLET, FILM COATED ORAL
Qty: 0 | Refills: 0 | COMMUNITY

## 2019-01-23 RX ORDER — AMLODIPINE BESYLATE 2.5 MG/1
1 TABLET ORAL
Qty: 0 | Refills: 0 | COMMUNITY

## 2019-01-23 RX ADMIN — APIXABAN 10 MILLIGRAM(S): 2.5 TABLET, FILM COATED ORAL at 05:12

## 2019-01-23 RX ADMIN — Medication 60 MILLIGRAM(S): at 13:09

## 2019-01-23 RX ADMIN — Medication 1 DROP(S): at 09:49

## 2019-01-23 RX ADMIN — PREGABALIN 1000 MICROGRAM(S): 225 CAPSULE ORAL at 09:49

## 2019-01-23 RX ADMIN — LOSARTAN POTASSIUM 100 MILLIGRAM(S): 100 TABLET, FILM COATED ORAL at 05:13

## 2019-01-23 RX ADMIN — Medication 50 MILLIGRAM(S): at 05:12

## 2019-01-23 RX ADMIN — APIXABAN 10 MILLIGRAM(S): 2.5 TABLET, FILM COATED ORAL at 17:18

## 2019-01-23 RX ADMIN — Medication 81 MILLIGRAM(S): at 09:48

## 2019-01-23 RX ADMIN — POLYETHYLENE GLYCOL 3350 17 GRAM(S): 17 POWDER, FOR SOLUTION ORAL at 05:12

## 2019-01-23 NOTE — DISCHARGE NOTE ADULT - HOSPITAL COURSE
83 year old M (poor historian), that has a history of HTN, HLD, glaucoma, ?a-fib (no documented ECG, but was on Eliquis in the past), and CAD s/p PCI x 2 (2006) that presents to the ED with chest pain. He states that the chest pain started the night prior to admission after eating. He took seltzer at home which relieved his symptoms, and then he went to sleep. When he awoke the pain was still there so he called his son who bought him into the hospital. He states that the pain has subsided for the most part, but he does have constipation. Patient with Hypertensive urgency s/p hydralazine 10 mg IV x1 , c/w Hydralazine 50 mg TID. CTA chest-  Right lower lobe pulmonary embolus. Patient re started on eliquis. ECHO with an EF of 60-65% and Mild diastolic dysfunction (Stage I). LE dopper with +DVT right posterior tibial peroneal trunk, extending through the popliteal vein and into the right femoral vein in the distal calf. Patient seen by cardiology and cleared for discharge. Patient to go home with the same home medications of metoprolol, amlodipine and losartan to help with compliance. Pt to follow up with out patient cardiologist within 2 weeks of discharge. Pulmonology seen patient doubt chest pain related to solitary subsegmental PE seen on CTA - this would likely be asymptomatic. Patient is stable and cleared for discharge home by medical attending. Pt given a coupon to decrease his co-pay for eliquis to $10 per month for 24 months.

## 2019-01-23 NOTE — PROGRESS NOTE ADULT - SUBJECTIVE AND OBJECTIVE BOX
SUBJ:  Feeling well. No acute events. Denies shortness of breath and chest pain. Discharge disposition.     MEDICATIONS  (STANDING):  apixaban 10 milliGRAM(s) Oral every 12 hours  aspirin enteric coated 81 milliGRAM(s) Oral daily  atorvastatin 10 milliGRAM(s) Oral at bedtime  losartan 100 milliGRAM(s) Oral daily  metoprolol succinate ER 50 milliGRAM(s) Oral daily  NIFEdipine XL 60 milliGRAM(s) Oral daily  polyethylene glycol 3350 17 Gram(s) Oral two times a day  timolol 0.25% Solution 1 Drop(s) Both EYES daily    Vital Signs Last 24 Hrs  T(C): 36.4 (23 Jan 2019 12:09), Max: 36.7 (22 Jan 2019 20:34)  T(F): 97.5 (23 Jan 2019 12:09), Max: 98.1 (22 Jan 2019 20:34)  HR: 57 (23 Jan 2019 12:09) (52 - 74)  BP: 156/71 (23 Jan 2019 12:09) (146/63 - 156/71)  RR: 18 (23 Jan 2019 12:09) (17 - 18)  SpO2: 97% (23 Jan 2019 12:09) (97% - 99%)    REVIEW OF SYSTEMS:  As per HPI, otherwise unremarkable.     PHYSICAL EXAM:  · CONSTITUTIONAL: Well-developed, well nourished      · EYES: no drainage or redness  · NECK: supple  ·RESPIRATORY:   airway patent; breath sounds equal; good air movement; respirations non-labored; clear to auscultation bilaterally; no rales,rhonchi or wheeze  · CARDIOVASCULAR: regular rate and rhythm  . GASTROINTESTINAL:  no distention  · EXTREMITIES: No cyanosis, clubbing  · VASCULAR:  Equal and normal pulses (radial  ·NEUROLOGICAL:  Alert and oriented x 3  · SKIN: No lesions; no rash  . LYMPH NODES: No lymphadedenopathy  · MUSCULOSKELETAL:  No calf tenderness 	    TELEMETRY: NSR    LABS:   CBC Full  -  ( 22 Jan 2019 06:45 )  WBC Count : 5.8 K/uL  Hemoglobin : 14.3 g/dL  Hematocrit : 42.0 %  Platelet Count - Automated : 169 K/uL  Mean Cell Volume : 87.6 fl  Mean Cell Hemoglobin : 29.9 pg  Mean Cell Hemoglobin Concentration : 34.1 gm/dL    01-22    141  |  105  |  18  ----------------------------<  107<H>  3.6   |  23  |  1.39<H>    Ca    8.9      22 Jan 2019 06:43  Mg     2.2     01-22    ECHO 1/22/2019  Conclusions:  1. Normal left ventricular internal dimensions and wall  thicknesses.  2. Normal left ventricular systolic function. No segmental  wall motion abnormalities.  3. Mild diastolic dysfunction (Stage I).  4. Normal right ventricular size and function. No evidence  of strain on current study.  5. Estimated pulmonary artery systolic pressure equals 32  mm Hg, assuming right atrial pressure equals 8  mm Hg,  consistent with normal pulmonary pressures.  *** No previous Echo exam.    CT Chest 1/20/2019  IMPRESSION: Right lower lobe pulmonary embolus.    IMPRESSION AND PLAN:  3 year old M (poor historian), that has a history of HTN, HLD, glaucoma, ?a-fib (no documented ECG, but was on Eliquis in the past), and CAD s/p PCI x 2 (2006) that presents with hypertensive urgency and subsegmental PE.     1) PE   Asymptomatic at this time  No oxygen requirements    ·	Continue medical management with apixaban 10 mg BID for 7 days followed by 5 mg BID indefinitely or until otherwise informed to stop.     2) CAD   s/p PCI x2 2006  Troponin negative     ·	Continue medical management with aspirin 81 mg daily, atorvastatin 10 mg nightly, metoprolol succinate 50 mg daily, losartan 100 mg daily.     3) HTN   Moderately controlled  Systolics 140-150s.     ·	Continue home regimen metoprolol succinate 50 mg daily, losartan 100 mg daily, amlodipine 10 mg daily. Hydralazine 50 mg TID will be very difficult to maintain compliance. Can consider nifedipine 30-60 mg in place of amlodipine but can be addressed outpatient.   ·	Follow up closely outpatient.     Tommie Reilly MD, MPH, CHARIS  Cardiovascular Specialist Attending  [facility]  C: [phone]  E: [email or web address]  (Cardiology nocturnist available every night 7 pm - 7 am; available week days for follow-up; general cardiology consult coverage weekend days)

## 2019-01-23 NOTE — DISCHARGE NOTE ADULT - ADDITIONAL INSTRUCTIONS
Follow up with Primary provider within 1-2 weeks of discharge.   Follow up with cardiology within 2 weeks of discharge

## 2019-01-23 NOTE — DISCHARGE NOTE ADULT - PATIENT PORTAL LINK FT
You can access the [facility], offered by [facility], by registering with the following website: [email or web address]

## 2019-01-23 NOTE — DISCHARGE NOTE ADULT - MEDICATION SUMMARY - MEDICATIONS TO STOP TAKING
I will STOP taking the medications listed below when I get home from the hospital:    valsartan 320 mg oral tablet  -- 1 tab(s) by mouth once a day    famotidine 20 mg oral tablet  -- 1 tab(s) by mouth once a day (in the morning before a meal)

## 2019-01-23 NOTE — DISCHARGE NOTE ADULT - MEDICATION SUMMARY - MEDICATIONS TO TAKE
I will START or STAY ON the medications listed below when I get home from the hospital:    aspirin 81 mg oral tablet  -- 1 tab(s) by mouth once a day  -- Indication: For CAD (coronary artery disease)    losartan 100 mg oral tablet  -- 1 tab(s) by mouth once a day  -- Indication: For HTN (hypertension)    apixaban 5 mg oral tablet  -- 2 tab(s) by mouth every 12 hours for 4 more days (8 more doses)  switch to 1 tab by mouth every 12 hours   -- Indication: For DVT (deep venous thrombosis)    pravastatin 40 mg oral tablet  -- 1 tab(s) by mouth once a day (at bedtime)  -- Indication: For Hyperlipidemia    metoprolol succinate 50 mg oral tablet, extended release  -- 1 tab(s) by mouth once a day  -- Indication: For HTN (hypertension)    amLODIPine 10 mg oral tablet  -- 1 tab(s) by mouth once a day  -- Indication: For HTN (hypertension)    famotidine 20 mg oral tablet  -- 1 tab(s) by mouth once a day (in the morning before a meal)  -- Indication: For GERD    Flax Seed Oil  -- Indication: For supplement    timolol maleate 0.25% ophthalmic gel forming solution  -- 1 drop(s) in each eye once a day (in the morning)  -- Indication: For DVT (deep venous thrombosis)    Artificial Tears ophthalmic solution  -- 1 drop(s) to each affected eye , As Needed  -- Indication: For eye pressure    Vitamin D3  -- Indication: For supplement    Vitamin D3 1000 intl units oral tablet  -- 1 tab(s) by mouth once a day  -- Indication: For supplement

## 2019-01-23 NOTE — DISCHARGE NOTE ADULT - CARE PLAN
Principal Discharge DX:	Pulmonary embolism  Goal:	Symptom improved  Assessment and plan of treatment:	Take your plavix as directed.  Follow up with your health care provider within one week. Call for appointment.  If you develop shortness of breath or if your shortness of breath worsens call your Health Care Provider or go to the Emergency Department.  Secondary Diagnosis:	HTN (hypertension)  Assessment and plan of treatment:	Follow up with your medical doctor to establish long term blood pressure treatment goals.  Secondary Diagnosis:	CAD (coronary artery disease)  Assessment and plan of treatment:	Coronary artery disease is a condition where the arteries the supply the heart muscle get clogges with fatty deposits & puts you at risk for a heart attack  Call your doctor if you have any new pain, pressure, or discomfort in the center of your chest, pain, tingling or discomfort in arms, back, neck, jaw, or stomach, shortness of breath, nausea, vomiting, burping or heartburn, sweating, cold and clammy skin, racing or abnormal heartbeat for more than 10 minutes or if they keep coming & going.  Call 911 and do not tr to get to hospital by care  You can help yourself with lefestyle changes (quitting smoking if you smoke), eat lots of fruits & vegetables & low fat dairy products, not a lot of meat & fatty foods, walk or some form of physical activity most days of the week, lose weight if you are overweight  Take your cardiac medication as prescribed to lower cholesterol, to lower blood pressure, aspirin to prevent blood clots, and diabetes control  Make sure to keep appointments with doctor for cardiac follow up care  Secondary Diagnosis:	DVT (deep venous thrombosis)  Assessment and plan of treatment:	Take your Plavix as prescribed.  Walking is encouraged, increase activity as tolerated.  If you develop new leg pain, swelling, and/or redness contact your healthcare provider.  If you develop new chest pain with difficulty breathing, a rapid heart rate and/or a feeling of passing out call emergency medical services 911. Principal Discharge DX:	Pulmonary embolism  Goal:	Symptom improved  Assessment and plan of treatment:	Take your Eliquis as directed.  Follow up with your health care provider within one week. Call for appointment.  If you develop shortness of breath or if your shortness of breath worsens call your Health Care Provider or go to the Emergency Department.  Secondary Diagnosis:	HTN (hypertension)  Assessment and plan of treatment:	Follow up with your medical doctor to establish long term blood pressure treatment goals.  Secondary Diagnosis:	CAD (coronary artery disease)  Assessment and plan of treatment:	Coronary artery disease is a condition where the arteries the supply the heart muscle get clogges with fatty deposits & puts you at risk for a heart attack  Call your doctor if you have any new pain, pressure, or discomfort in the center of your chest, pain, tingling or discomfort in arms, back, neck, jaw, or stomach, shortness of breath, nausea, vomiting, burping or heartburn, sweating, cold and clammy skin, racing or abnormal heartbeat for more than 10 minutes or if they keep coming & going.  Call 911 and do not tr to get to hospital by care  You can help yourself with lefestyle changes (quitting smoking if you smoke), eat lots of fruits & vegetables & low fat dairy products, not a lot of meat & fatty foods, walk or some form of physical activity most days of the week, lose weight if you are overweight  Take your cardiac medication as prescribed to lower cholesterol, to lower blood pressure, aspirin to prevent blood clots, and diabetes control  Make sure to keep appointments with doctor for cardiac follow up care  Secondary Diagnosis:	DVT (deep venous thrombosis)  Assessment and plan of treatment:	Take your eliquis as prescribed.  Walking is encouraged, increase activity as tolerated.  If you develop new leg pain, swelling, and/or redness contact your healthcare provider.  If you develop new chest pain with difficulty breathing, a rapid heart rate and/or a feeling of passing out call emergency medical services 911.

## 2019-01-23 NOTE — PROGRESS NOTE ADULT - SUBJECTIVE AND OBJECTIVE BOX
Patient is a 83y old  Male who presents with a chief complaint of chest pain (23 Jan 2019 14:22)      SUBJECTIVE / OVERNIGHT EVENTS:  No chest pain. No shortness of breath. No complaints. No events overnight.     MEDICATIONS  (STANDING):  apixaban 10 milliGRAM(s) Oral every 12 hours  aspirin enteric coated 81 milliGRAM(s) Oral daily  atorvastatin 10 milliGRAM(s) Oral at bedtime  losartan 100 milliGRAM(s) Oral daily  metoprolol succinate ER 50 milliGRAM(s) Oral daily  NIFEdipine XL 60 milliGRAM(s) Oral daily  polyethylene glycol 3350 17 Gram(s) Oral two times a day  timolol 0.25% Solution 1 Drop(s) Both EYES daily    MEDICATIONS  (PRN):      Vital Signs Last 24 Hrs  T(C): 36.4 (23 Jan 2019 12:09), Max: 36.7 (22 Jan 2019 20:34)  T(F): 97.5 (23 Jan 2019 12:09), Max: 98.1 (22 Jan 2019 20:34)  HR: 57 (23 Jan 2019 12:09) (52 - 74)  BP: 156/71 (23 Jan 2019 12:09) (146/63 - 156/71)  BP(mean): --  RR: 18 (23 Jan 2019 12:09) (17 - 18)  SpO2: 97% (23 Jan 2019 12:09) (97% - 99%)  CAPILLARY BLOOD GLUCOSE        I&O's Summary    22 Jan 2019 07:01  -  23 Jan 2019 07:00  --------------------------------------------------------  IN: 480 mL / OUT: 200 mL / NET: 280 mL    23 Jan 2019 07:01  -  23 Jan 2019 15:01  --------------------------------------------------------  IN: 220 mL / OUT: 0 mL / NET: 220 mL        PHYSICAL EXAM:  GENERAL: NAD, well-developed  HEAD:  Atraumatic, Normocephalic  EYES: EOMI, PERRLA, conjunctiva and sclera clear  NECK: Supple, No JVD  CHEST/LUNG: Clear to auscultation bilaterally; No wheeze  HEART: Regular rate and rhythm; No murmurs, rubs, or gallops  ABDOMEN: Soft, Nontender, Nondistended; Bowel sounds present  EXTREMITIES:  2+ Peripheral Pulses, No clubbing, cyanosis, or edema  PSYCH: AAOx3  NEUROLOGY: non-focal  SKIN: No rashes or lesions    LABS:                        14.3   5.8   )-----------( 169      ( 22 Jan 2019 06:45 )             42.0     01-22    141  |  105  |  18  ----------------------------<  107<H>  3.6   |  23  |  1.39<H>    Ca    8.9      22 Jan 2019 06:43  Mg     2.2     01-22                RADIOLOGY & ADDITIONAL TESTS:    Imaging Personally Reviewed:    Consultant(s) Notes Reviewed:      Care Discussed with Consultants/Other Providers:

## 2019-01-23 NOTE — DISCHARGE NOTE ADULT - PROVIDER TOKENS
FREE:[LAST:[[facility]icine [facility]],PHONE:[phone]],FAX:[(   )    -],ADDRESS:[[address] [place] [address], [address]]],FREE:[LAST:[Cardiology Clinic],PHONE:[phone]],FAX:[(   )    -],ADDRESS:[[facility]  [address] [place], [place], [address]]]

## 2019-01-23 NOTE — PROGRESS NOTE ADULT - ASSESSMENT
83M hx HTN, CAD, recent changes to BP meds, p/w multiple complaints- notes a few weeks of intermittent abd pain, vague, aching in nature. also during this time has been having intermittent frontal headaches which are new. in the last few days has started with intermittent substernal chest pressure/burning with feelings of lightheadedness. no sob no n/v. +constipation. no falls no fevrs/chills.    pulm embolism  -  eliquis  -  le doppler pos for DVT  - pulm eval  - will need lifelong a/c    hypertensive urgency  - cw cozaar  - add hydralazine 50 tid per cardiology    cad  - cw asa    constipation  -  miralax

## 2019-01-23 NOTE — DISCHARGE NOTE ADULT - PLAN OF CARE
Symptom improved Take your plavix as directed.  Follow up with your health care provider within one week. Call for appointment.  If you develop shortness of breath or if your shortness of breath worsens call your Health Care Provider or go to the Emergency Department. Follow up with your medical doctor to establish long term blood pressure treatment goals. Coronary artery disease is a condition where the arteries the supply the heart muscle get clogges with fatty deposits & puts you at risk for a heart attack  Call your doctor if you have any new pain, pressure, or discomfort in the center of your chest, pain, tingling or discomfort in arms, back, neck, jaw, or stomach, shortness of breath, nausea, vomiting, burping or heartburn, sweating, cold and clammy skin, racing or abnormal heartbeat for more than 10 minutes or if they keep coming & going.  Call 911 and do not tr to get to hospital by care  You can help yourself with lefestyle changes (quitting smoking if you smoke), eat lots of fruits & vegetables & low fat dairy products, not a lot of meat & fatty foods, walk or some form of physical activity most days of the week, lose weight if you are overweight  Take your cardiac medication as prescribed to lower cholesterol, to lower blood pressure, aspirin to prevent blood clots, and diabetes control  Make sure to keep appointments with doctor for cardiac follow up care Take your Plavix as prescribed.  Walking is encouraged, increase activity as tolerated.  If you develop new leg pain, swelling, and/or redness contact your healthcare provider.  If you develop new chest pain with difficulty breathing, a rapid heart rate and/or a feeling of passing out call emergency medical services 911. Take your Eliquis as directed.  Follow up with your health care provider within one week. Call for appointment.  If you develop shortness of breath or if your shortness of breath worsens call your Health Care Provider or go to the Emergency Department. Take your eliquis as prescribed.  Walking is encouraged, increase activity as tolerated.  If you develop new leg pain, swelling, and/or redness contact your healthcare provider.  If you develop new chest pain with difficulty breathing, a rapid heart rate and/or a feeling of passing out call emergency medical services 911.

## 2019-01-23 NOTE — DISCHARGE NOTE ADULT - ADMISSION DATE +STARTOFVISITDATE
Statement Selected Secondary Intention Text (Leave Blank If You Do Not Want): The defect will heal with secondary intention.

## 2019-01-23 NOTE — CHART NOTE - NSCHARTNOTEFT_GEN_A_CORE
Patient is a 83y old  Male who presents with a chief complaint of chest pain (23 Jan 2019 15:01)  Discharge Note and Plan: Patient presented with initial complaint of CP. Found to have PE and DVT. Pt restarted on Eliquis which he originally self d/c'ed secondary to cost and lack of  understanding why he was taking the medication. Pt echo with an EF of 60-65% and mild diastolic dysfunction. Pt without any event on telemetry. Patient seen by cardiology Dr. Reilly. Plan is to D/C patient back on his home medications of Losartan 100 mg daily, Toprol XL 50 mg daily and Amlodipine 10mg daily. Pt reports he was previously on procardia but it was stopped and he is not sure why. Patient reports he has had blood pressures of 130's while on his previous home regiment. Cardiology feels patient will have better compliance with his home regiment. Patient is cleared for discharge back home by medical attending Dr. Gonsalez. Patient eliquis sent to the pharmacy. With the help of social work patient given a coupon to make his co payment for eliquis $10 for 24 months. Stressed the importance of medication compliance with the patient.     Vital Signs Last 24 Hrs  T(C): 36.4 (23 Jan 2019 12:09), Max: 36.7 (22 Jan 2019 20:34)  T(F): 97.5 (23 Jan 2019 12:09), Max: 98.1 (22 Jan 2019 20:34)  HR: 57 (23 Jan 2019 12:09) (52 - 74)  BP: 156/71 (23 Jan 2019 12:09) (146/63 - 156/71)  BP(mean): --  RR: 18 (23 Jan 2019 12:09) (17 - 18)  SpO2: 97% (23 Jan 2019 12:09) (97% - 99%)                          14.3   5.8   )-----------( 169      ( 22 Jan 2019 06:45 )             42.0     01-22    141  |  105  |  18  ----------------------------<  107<H>  3.6   |  23  |  1.39<H>    Ca    8.9      22 Jan 2019 06:43  Mg     2.2     01-22      Pt alert and oriented, without any chest pain, SOB or other complaints.   Chest: normal s1, s2  Lungs: CTA B    >Follow up with primary provider and cardiologist within 1-2 weeks of discharge.

## 2019-01-23 NOTE — CONSULT NOTE ADULT - SUBJECTIVE AND OBJECTIVE BOX
PULMONARY CONSULT  Ike French MD  [phone]    Initial HPI on admission:  HPI:  83M hx HTN, CAD, recent changes to BP meds, p/w multiple complaints- notes a few weeks of intermittent abd pain, vague, aching in nature. also during this time has been having intermittent frontal headaches which are new. in the last few days has started with intermittent substernal chest pressure/burning with feelings of lightheadedness. no sob no n/v. +constipation. no falls no fevrs/chills. (20 Jan 2019 21:35)    Patient has history 1PPD smoking, DC 20+ years ago: denies history of COPD/Asthma. Currently he is pain free. No prior history of VTE, no overt risk factors, no FH.      PAST MEDICAL & SURGICAL HISTORY:  CAD (coronary artery disease)  HTN (hypertension)  Hyperlipidemia  Hypertension  CAD (coronary artery disease): s/p 2 stents  No significant past surgical history  No significant past surgical history    FAMILY HISTORY:  No pertinent family history in first degree relatives  No pertinent family history in first degree relatives     Social History:  Social History (marital status, living situation, occupation, tobacco use, alcohol and drug use, and sexual history): no tob no etoh	     Tobacco Screening:  · Core Measure Site	No	       Review of Systems:  · Negative General Symptoms	no fever; no chills; no anorexia; no weight loss; no polydipsia; no malaise; no fatigue	  · Skin/Breast	negative	  · Ophthalmologic	negative	  · ENMT	negative	  · Negative Respiratory and Thorax Symptoms	no wheezing; no dyspnea; no cough; no hemoptysis	  · Negative Cardiovascular Symptoms	no palpitations; no dyspnea on exertion; no orthopnea; no paroxysmal nocturnal dyspnea	  · Cardiovascular Symptoms	chest pain	  · Negative Gastrointestinal Symptoms	no nausea; no vomiting; no diarrhea; no melena; no hematochezia	  · Gastrointestinal Symptoms	constipation	  · Genitourinary	negative	  · Musculoskeletal	negative	  · Neurological Symptoms	headache	  · Psychiatric	negative	  · Hematology/Lymphatics	negative	  · Endocrine	negative	  · Allergic/Immunologic	negative	      Allergies and Intolerances:        Allergies:  	No Known Allergies:     Medications:  MEDICATIONS  (STANDING):  apixaban 10 milliGRAM(s) Oral every 12 hours  aspirin enteric coated 81 milliGRAM(s) Oral daily  atorvastatin 10 milliGRAM(s) Oral at bedtime  hydrALAZINE 50 milliGRAM(s) Oral every 8 hours  losartan 100 milliGRAM(s) Oral daily  metoprolol succinate ER 50 milliGRAM(s) Oral daily  polyethylene glycol 3350 17 Gram(s) Oral two times a day  timolol 0.25% Solution 1 Drop(s) Both EYES daily    MEDICATIONS  (PRN):    Vital Signs Last 24 Hrs  T(C): 36.7 (23 Jan 2019 04:39), Max: 36.7 (22 Jan 2019 20:34)  T(F): 98 (23 Jan 2019 04:39), Max: 98.1 (22 Jan 2019 20:34)  HR: 56 (23 Jan 2019 08:47) (52 - 86)  BP: 155/65 (23 Jan 2019 08:47) (146/63 - 168/68)  BP(mean): --  RR: 18 (23 Jan 2019 04:39) (17 - 18)  SpO2: 97% (23 Jan 2019 04:39) (97% - 99%)      01-22 @ 07:01  -  01-23 @ 07:00  --------------------------------------------------------  IN: 480 mL / OUT: 200 mL / NET: 280 mL      LABS:                        14.3   5.8   )-----------( 169      ( 22 Jan 2019 06:45 )             42.0     01-22    141  |  105  |  18  ----------------------------<  107<H>  3.6   |  23  |  1.39<H>    Ca    8.9      22 Jan 2019 06:43  Mg     2.2     01-22        Physical Examination:    General: Non toxic, No acute distress.  O2 sat 98% on RA    HEENT: Pupils equal, reactive to light.  Symmetric.    PULM: Clear to auscultation bilaterally, no significant sputum production    CVS: Regular rate and rhythm, no murmurs, rubs, or gallops    ABD: Soft, nondistended, nontender, normoactive bowel sounds, no masses    EXT: Trace to 1+ RLE edema below knee; incr girth RLE    SKIN: Warm and well perfused, no rashes noted.    NEURO: Alert, oriented, interactive, nonfocal    RADIOLOGY REVIEWED PERSONALLY  CXR:    CT chest:    PROCEDURE DATE:  01/20/2019      INTERPRETATION:  CLINICAL INFORMATION: Chest and abdominal pain.    COMPARISON: None.    PROCEDURE:   CT Angiography of the Chest, Abdomen and Pelvis.   Gated precontrast imaging was performed through the heart followed by   gated CT Angiography of the heart with subsequent non-gated arterial   phase imaging of the chest, abdomen and pelvis.  Intravenous contrast: 90 ml Omnipaque 350. 10 ml discarded.  Oral contrast:None.  Sagittal and coronal reformats were performed as well as 3D (MIP)   reconstructions.    FINDINGS:    CHEST:     LUNGS AND LARGE AIRWAYS: Patent central airways. No pulmonary nodules.   Emphysema.   PLEURA: No pleural effusion.  VESSELS: There is no aortic dissection. There is a filling defect within   segmental/subsegmental branch of a pulmonary artery in the superior   segment of the right lower lobe.  HEART: Heart size is normal.  No pericardial effusion.      MEDIASTINUM AND KELLEY: No lymphadenopathy.   CHEST WALL AND LOWER NECK: Within normal limits.     ABDOMEN AND PELVIS:    LIVER: Within normal limits.   BILE DUCTS: Normal caliber.  GALLBLADDER: Within normal limits.  SPLEEN: Within normal limits.   PANCREAS: Within normal limits.  ADRENALS: Within normal limits.   KIDNEYS/URETERS: Within normal limits.         BLADDER: Within normal limits.   REPRODUCTIVE ORGANS: The prostate is within normal limits.    BOWEL: No bowel obstruction. The appendix is normal.           PERITONEUM: No ascites.       VESSELS:  Within normal limits.  RETROPERITONEUM: No lymphadenopathy.     ABDOMINAL WALL: Within normal limits.  BONES: Within normal limits.     IMPRESSION: Right lower lobe pulmonary embolus.    TTE:    PROCEDURE: Transthoracic echocardiogram with 2-D, M-Mode  and complete spectral and color flow Doppler.  INDICATION: Other pulmonary embolism without acute cor  pulmonale (I26.99)  ------------------------------------------------------------------------  Dimensions:    Normal Values:  LA:     3.0    2.0 - 4.0 cm  Ao:     3.6    2.0 - 3.8 cm  SEPTUM: 1.1    0.6 - 1.2 cm  PWT:    0.9    0.6 - 1.1 cm  LVIDd:  4.6    3.0 - 5.6 cm  LVIDs:  2.8    1.8 - 4.0 cm  Derived variables:  LVMI: 81 g/m2  RWT: 0.39  Fractional short: 39 %  EF (Visual Estimate): 60-65 %  ------------------------------------------------------------------------  Observations:  Mitral Valve: Normal mitral valve.  Aortic Valve/Aorta: Normal trileaflet aortic valve.  Aortic Root: 3.6 cm.  Left Atrium: Normal left atrium.  LA volume index = 21  cc/m2.  Left Ventricle: Normal left ventricular systolic function.  No segmental wall motion abnormalities. Normal left  ventricular internal dimensions and wall thicknesses. Mild  diastolic dysfunction (Stage I).  Right Heart: Normal right atrium. Normal right ventricular  size and function. No evidence of strain on current study.  Normal tricuspid valve. Minimal tricuspid regurgitation.  Normal pulmonic valve.  Pericardium/Pleura: Normal pericardium with no pericardial  effusion.  Hemodynamic: Estimated right atrial pressure is 8 mm Hg.  Estimated right ventricular systolic pressure equals 32 mm  Hg, assuming right atrial pressure equals 8 mm Hg,  consistent with normal pulmonary pressures.  ------------------------------------------------------------------------  Conclusions:  1. Normal left ventricular internal dimensions and wall  thicknesses.  2. Normal left ventricular systolic function. No segmental  wall motion abnormalities.  3. Mild diastolic dysfunction (Stage I).  4. Normal right ventricular size and function. No evidence  of strain on current study.  5. Estimated pulmonary artery systolic pressure equals 32  mm Hg, assuming right atrial pressure equals 8  mm Hg,  consistent with normal pulmonary pressures.  *** No previous Echo exam.  ------------------------------------------------------------------------  Confirmed on  1/23/2019 - 07:47:36 by Pedro Justin M.D.  ------------------------------------------------------------------------

## 2019-01-23 NOTE — CONSULT NOTE ADULT - ASSESSMENT
CHest pain: doubt related to solitary subsegmental PE seen on CTA - this would likely be asymptomatic  RLE proximal and distal DVT: unprovoked  CAD  Mild diastolic dysfunction  Htn    REC    Resume full dose AC: indefinite as tolerated  Cardiology f/u  No need for oxygen at this time

## 2019-01-23 NOTE — DISCHARGE NOTE ADULT - CARE PROVIDER_API CALL
Medicine Clinic,   [address] [place] [address]  [address]  [place], [place], [address]  Phone: [phone]  Fax: (   )    -    Cardiology Clinic,   [facility]  1st floor  [place], [place], [address]  Phone: [phone]  Fax: (   )    -

## 2020-09-10 NOTE — H&P ADULT - NSCORESITESY/N_GEN_A_CORE_RD
Hypertension Medications Protocol Passed9/10 9:31 AM   CMP or BMP in past 12 months    Last serum creatinine< 2.0    Appointment in past 6 or next 3 months     Refilled per protocol   Last OV on 7 21 2020 No

## 2022-01-01 NOTE — PATIENT PROFILE ADULT - NSPROPTRIGHTBILLOFRIGHTS_GEN_A_NUR
patient I will SWITCH the dose or number of times a day I take the medications listed below when I get home from the hospital:  None

## 2022-07-26 NOTE — H&P ADULT - GEN GEN HX ROS MEA POS PC
malaise/chills/weakness/fatigue/fever [FreeTextEntry2] : Had GI Bleeding from Diverticulosis, [+] polyps\par \par Eliquis stopped, restarted by Cardio at 1/2 dose trial. \par \par Has diff with breathing, sat drops at home\par \par Daughter is reliable and helpful historian

## 2023-01-20 NOTE — PATIENT PROFILE ADULT. - NS PRO CONTRA FLU 1
76yMale with h/o BPH, presented to ED on 1/18 c/o shortness of breath.  BIBEMS on NRB.  Found to be in acute hypoxic respiratory failure.  Patient states shortness of breath began acutely the previous day and noticed mild LE edema recently.  No previous diagnosis of CHF in the past.  States he was told he may have kidney disease but does not know baseline creatinine.  Given lasix for diuresis.  Pt with decreased urine output.  Attempted goyal by ED and urology but unable to place.  States currently feeling better on 6L NC.  Thoracic surgery consulted for bilateral pleural effusions.     yes

## 2023-06-21 NOTE — ED ADULT NURSE NOTE - HOW OFTEN DO YOU HAVE SIX OR MORE DRINKS ON ONE OCCASION?
Mr Pillai returns to clinic today.  He has a history of right distal biceps tendon repair.  He is 8 weeks postop.  He is working with therapy.  He overall is doing well.      Physical exam:  Examination the right arm reveals that the incision is well healed.  There is no edema.  Palpation reveals that there is a palpable biceps tendon.  He is able to fully extend and flex to 150°.  He has full pronation and supination.  He is neurovascularly intact     Assessment:  Status post right distal biceps tendon repair     Plan:    1. Will continue to work with therapy for range of motion.  Gentle strengthening up to a weight limit of 2-3 lb is okay     2. He can discontinue the hinged elbow brace    3.  Will follow up with me in 4 weeks for final evaluation which point I will consider beginning to increase his activity as tolerated         Never

## 2025-03-25 NOTE — CONSULT NOTE ADULT - CONSULT REASON
"The patient is Watcher - Medium risk of patient condition declining or worsening    Shift Goals  Clinical Goals: safety, pain control  Problem: Fall Risk - Rehab  Goal: Patient will remain free from falls  Note: Blanka Pendleton Fall risk Assessment Score: 19    High fall risk Interventions   - Bed and strip alarm   - Yellow sign by the door   - Yellow wrist band \"Fall risk\"  - call light in reach  - Do not leave patient unattended in the bathroom  - Fall risk education provided     " Pulmonary Embolus/Chest Pain